# Patient Record
Sex: MALE | Race: OTHER | Employment: UNEMPLOYED | URBAN - METROPOLITAN AREA
[De-identification: names, ages, dates, MRNs, and addresses within clinical notes are randomized per-mention and may not be internally consistent; named-entity substitution may affect disease eponyms.]

---

## 2024-05-29 ENCOUNTER — HOSPITAL ENCOUNTER (INPATIENT)
Facility: HOSPITAL | Age: 44
LOS: 1 days | Discharge: LEFT BEFORE DISCHARGE | DRG: 770 | End: 2024-05-31
Attending: EMERGENCY MEDICINE | Admitting: STUDENT IN AN ORGANIZED HEALTH CARE EDUCATION/TRAINING PROGRAM
Payer: COMMERCIAL

## 2024-05-29 DIAGNOSIS — F10.929 ALCOHOL INTOXICATION (HCC): Primary | ICD-10-CM

## 2024-05-29 DIAGNOSIS — R09.02 HYPOXIA: ICD-10-CM

## 2024-05-29 DIAGNOSIS — R45.851 SUICIDAL IDEATIONS: ICD-10-CM

## 2024-05-29 LAB — ETHANOL SERPL-MCNC: 243 MG/DL

## 2024-05-29 PROCEDURE — 36415 COLL VENOUS BLD VENIPUNCTURE: CPT

## 2024-05-29 PROCEDURE — 82077 ASSAY SPEC XCP UR&BREATH IA: CPT

## 2024-05-29 PROCEDURE — 99285 EMERGENCY DEPT VISIT HI MDM: CPT

## 2024-05-29 NOTE — Clinical Note
Case was discussed with Kelsey Vivas PA-C and the patient's admission status was agreed to be Admission Status: observation status to the service of Dr. Geronimo

## 2024-05-30 ENCOUNTER — APPOINTMENT (EMERGENCY)
Dept: CT IMAGING | Facility: HOSPITAL | Age: 44
DRG: 770 | End: 2024-05-30
Payer: COMMERCIAL

## 2024-05-30 ENCOUNTER — APPOINTMENT (EMERGENCY)
Dept: RADIOLOGY | Facility: HOSPITAL | Age: 44
DRG: 770 | End: 2024-05-30
Payer: COMMERCIAL

## 2024-05-30 PROBLEM — F10.929 ALCOHOL INTOXICATION (HCC): Status: ACTIVE | Noted: 2024-05-30

## 2024-05-30 PROBLEM — E87.6 HYPOKALEMIA: Status: ACTIVE | Noted: 2024-05-30

## 2024-05-30 PROBLEM — R82.5 POSITIVE URINE DRUG SCREEN: Status: ACTIVE | Noted: 2024-05-30

## 2024-05-30 PROBLEM — R09.02 HYPOXIA: Status: ACTIVE | Noted: 2024-05-30

## 2024-05-30 LAB
ALBUMIN SERPL BCP-MCNC: 3.8 G/DL (ref 3.5–5)
ALP SERPL-CCNC: 62 U/L (ref 34–104)
ALT SERPL W P-5'-P-CCNC: 11 U/L (ref 7–52)
AMPHETAMINES SERPL QL SCN: NEGATIVE
ANION GAP SERPL CALCULATED.3IONS-SCNC: 9 MMOL/L (ref 4–13)
AST SERPL W P-5'-P-CCNC: 18 U/L (ref 13–39)
ATRIAL RATE: 87 BPM
BARBITURATES UR QL: NEGATIVE
BASOPHILS # BLD AUTO: 0.06 THOUSANDS/ÂΜL (ref 0–0.1)
BASOPHILS NFR BLD AUTO: 1 % (ref 0–1)
BENZODIAZ UR QL: POSITIVE
BILIRUB SERPL-MCNC: 0.32 MG/DL (ref 0.2–1)
BUN SERPL-MCNC: 13 MG/DL (ref 5–25)
CALCIUM SERPL-MCNC: 8.3 MG/DL (ref 8.4–10.2)
CARDIAC TROPONIN I PNL SERPL HS: <2 NG/L
CHLORIDE SERPL-SCNC: 108 MMOL/L (ref 96–108)
CO2 SERPL-SCNC: 26 MMOL/L (ref 21–32)
COCAINE UR QL: NEGATIVE
CREAT SERPL-MCNC: 0.7 MG/DL (ref 0.6–1.3)
EOSINOPHIL # BLD AUTO: 0.2 THOUSAND/ÂΜL (ref 0–0.61)
EOSINOPHIL NFR BLD AUTO: 3 % (ref 0–6)
ERYTHROCYTE [DISTWIDTH] IN BLOOD BY AUTOMATED COUNT: 13.7 % (ref 11.6–15.1)
ETHANOL SERPL-MCNC: 148 MG/DL
FENTANYL UR QL SCN: NEGATIVE
GFR SERPL CREATININE-BSD FRML MDRD: 116 ML/MIN/1.73SQ M
GLUCOSE SERPL-MCNC: 88 MG/DL (ref 65–140)
HCT VFR BLD AUTO: 40.4 % (ref 36.5–49.3)
HGB BLD-MCNC: 13.5 G/DL (ref 12–17)
HYDROCODONE UR QL SCN: NEGATIVE
IMM GRANULOCYTES # BLD AUTO: 0.06 THOUSAND/UL (ref 0–0.2)
IMM GRANULOCYTES NFR BLD AUTO: 1 % (ref 0–2)
LYMPHOCYTES # BLD AUTO: 2.27 THOUSANDS/ÂΜL (ref 0.6–4.47)
LYMPHOCYTES NFR BLD AUTO: 31 % (ref 14–44)
MCH RBC QN AUTO: 30.6 PG (ref 26.8–34.3)
MCHC RBC AUTO-ENTMCNC: 33.4 G/DL (ref 31.4–37.4)
MCV RBC AUTO: 92 FL (ref 82–98)
METHADONE UR QL: NEGATIVE
MONOCYTES # BLD AUTO: 0.34 THOUSAND/ÂΜL (ref 0.17–1.22)
MONOCYTES NFR BLD AUTO: 5 % (ref 4–12)
NEUTROPHILS # BLD AUTO: 4.52 THOUSANDS/ÂΜL (ref 1.85–7.62)
NEUTS SEG NFR BLD AUTO: 59 % (ref 43–75)
NRBC BLD AUTO-RTO: 0 /100 WBCS
OPIATES UR QL SCN: NEGATIVE
OXYCODONE+OXYMORPHONE UR QL SCN: NEGATIVE
P AXIS: 50 DEGREES
PCP UR QL: NEGATIVE
PLATELET # BLD AUTO: 177 THOUSANDS/UL (ref 149–390)
PMV BLD AUTO: 8.1 FL (ref 8.9–12.7)
POTASSIUM SERPL-SCNC: 3.1 MMOL/L (ref 3.5–5.3)
PR INTERVAL: 162 MS
PROT SERPL-MCNC: 6.6 G/DL (ref 6.4–8.4)
QRS AXIS: 87 DEGREES
QRSD INTERVAL: 100 MS
QT INTERVAL: 390 MS
QTC INTERVAL: 469 MS
RBC # BLD AUTO: 4.41 MILLION/UL (ref 3.88–5.62)
SODIUM SERPL-SCNC: 143 MMOL/L (ref 135–147)
T WAVE AXIS: 9 DEGREES
THC UR QL: POSITIVE
VENTRICULAR RATE: 87 BPM
WBC # BLD AUTO: 7.45 THOUSAND/UL (ref 4.31–10.16)

## 2024-05-30 PROCEDURE — 84484 ASSAY OF TROPONIN QUANT: CPT

## 2024-05-30 PROCEDURE — 85025 COMPLETE CBC W/AUTO DIFF WBC: CPT

## 2024-05-30 PROCEDURE — 71275 CT ANGIOGRAPHY CHEST: CPT

## 2024-05-30 PROCEDURE — 71045 X-RAY EXAM CHEST 1 VIEW: CPT

## 2024-05-30 PROCEDURE — 36415 COLL VENOUS BLD VENIPUNCTURE: CPT

## 2024-05-30 PROCEDURE — 80307 DRUG TEST PRSMV CHEM ANLYZR: CPT

## 2024-05-30 PROCEDURE — 82077 ASSAY SPEC XCP UR&BREATH IA: CPT

## 2024-05-30 PROCEDURE — 93010 ELECTROCARDIOGRAM REPORT: CPT | Performed by: INTERNAL MEDICINE

## 2024-05-30 PROCEDURE — 93005 ELECTROCARDIOGRAM TRACING: CPT

## 2024-05-30 PROCEDURE — 99223 1ST HOSP IP/OBS HIGH 75: CPT | Performed by: STUDENT IN AN ORGANIZED HEALTH CARE EDUCATION/TRAINING PROGRAM

## 2024-05-30 PROCEDURE — 80053 COMPREHEN METABOLIC PANEL: CPT

## 2024-05-30 RX ORDER — DIAZEPAM 5 MG/ML
10 INJECTION, SOLUTION INTRAMUSCULAR; INTRAVENOUS ONCE
Status: DISCONTINUED | OUTPATIENT
Start: 2024-05-30 | End: 2024-05-30

## 2024-05-30 RX ORDER — LORAZEPAM 2 MG/ML
4 INJECTION INTRAMUSCULAR ONCE
Status: CANCELLED | OUTPATIENT
Start: 2024-05-30 | End: 2024-05-30

## 2024-05-30 RX ORDER — DOCUSATE SODIUM 100 MG/1
100 CAPSULE, LIQUID FILLED ORAL 2 TIMES DAILY
Status: DISCONTINUED | OUTPATIENT
Start: 2024-05-30 | End: 2024-05-31

## 2024-05-30 RX ORDER — BUPROPION HYDROCHLORIDE 75 MG/1
75 TABLET ORAL 2 TIMES DAILY
COMMUNITY

## 2024-05-30 RX ORDER — LORAZEPAM 2 MG/ML
2 INJECTION INTRAMUSCULAR ONCE
Status: COMPLETED | OUTPATIENT
Start: 2024-05-30 | End: 2024-05-30

## 2024-05-30 RX ORDER — BUPRENORPHINE HYDROCHLORIDE AND NALOXONE HYDROCHLORIDE DIHYDRATE 8; 2 MG/1; MG/1
1 TABLET SUBLINGUAL DAILY
COMMUNITY

## 2024-05-30 RX ORDER — LORAZEPAM 1 MG/1
2 TABLET ORAL ONCE
Status: COMPLETED | OUTPATIENT
Start: 2024-05-30 | End: 2024-05-30

## 2024-05-30 RX ORDER — NICOTINE 21 MG/24HR
1 PATCH, TRANSDERMAL 24 HOURS TRANSDERMAL DAILY
Status: DISCONTINUED | OUTPATIENT
Start: 2024-05-30 | End: 2024-05-31

## 2024-05-30 RX ORDER — SENNOSIDES 8.6 MG
1 TABLET ORAL DAILY
Status: DISCONTINUED | OUTPATIENT
Start: 2024-05-30 | End: 2024-05-31

## 2024-05-30 RX ORDER — FOLIC ACID 1 MG/1
1 TABLET ORAL DAILY
Status: DISCONTINUED | OUTPATIENT
Start: 2024-05-30 | End: 2024-05-31

## 2024-05-30 RX ORDER — ACETAMINOPHEN 325 MG/1
650 TABLET ORAL EVERY 6 HOURS PRN
Status: DISCONTINUED | OUTPATIENT
Start: 2024-05-30 | End: 2024-05-31 | Stop reason: HOSPADM

## 2024-05-30 RX ORDER — POTASSIUM CHLORIDE 20 MEQ/1
40 TABLET, EXTENDED RELEASE ORAL ONCE
Status: COMPLETED | OUTPATIENT
Start: 2024-05-30 | End: 2024-05-30

## 2024-05-30 RX ORDER — CHLORDIAZEPOXIDE HYDROCHLORIDE 25 MG/1
25 CAPSULE, GELATIN COATED ORAL EVERY 12 HOURS
Status: DISCONTINUED | OUTPATIENT
Start: 2024-05-30 | End: 2024-05-31

## 2024-05-30 RX ORDER — DIAZEPAM 5 MG/ML
10 INJECTION, SOLUTION INTRAMUSCULAR; INTRAVENOUS ONCE
Status: COMPLETED | OUTPATIENT
Start: 2024-05-30 | End: 2024-05-30

## 2024-05-30 RX ORDER — LANOLIN ALCOHOL/MO/W.PET/CERES
100 CREAM (GRAM) TOPICAL DAILY
Status: DISCONTINUED | OUTPATIENT
Start: 2024-05-30 | End: 2024-05-31

## 2024-05-30 RX ORDER — HEPARIN SODIUM 5000 [USP'U]/ML
5000 INJECTION, SOLUTION INTRAVENOUS; SUBCUTANEOUS EVERY 8 HOURS SCHEDULED
Status: DISCONTINUED | OUTPATIENT
Start: 2024-05-30 | End: 2024-05-31

## 2024-05-30 RX ORDER — SODIUM CHLORIDE 9 MG/ML
100 INJECTION, SOLUTION INTRAVENOUS CONTINUOUS
Status: DISCONTINUED | OUTPATIENT
Start: 2024-05-30 | End: 2024-05-31

## 2024-05-30 RX ORDER — LORAZEPAM 2 MG/ML
4 INJECTION INTRAMUSCULAR ONCE
Status: COMPLETED | OUTPATIENT
Start: 2024-05-30 | End: 2024-05-30

## 2024-05-30 RX ORDER — CALCIUM CARBONATE 500 MG/1
1000 TABLET, CHEWABLE ORAL DAILY PRN
Status: DISCONTINUED | OUTPATIENT
Start: 2024-05-30 | End: 2024-05-31 | Stop reason: HOSPADM

## 2024-05-30 RX ORDER — METHYLPREDNISOLONE SODIUM SUCCINATE 40 MG/ML
40 INJECTION, POWDER, LYOPHILIZED, FOR SOLUTION INTRAMUSCULAR; INTRAVENOUS DAILY
Status: DISCONTINUED | OUTPATIENT
Start: 2024-05-30 | End: 2024-05-31

## 2024-05-30 RX ORDER — POTASSIUM CHLORIDE 20 MEQ/1
40 TABLET, EXTENDED RELEASE ORAL ONCE
Status: DISCONTINUED | OUTPATIENT
Start: 2024-05-30 | End: 2024-05-30

## 2024-05-30 RX ORDER — ONDANSETRON 2 MG/ML
4 INJECTION INTRAMUSCULAR; INTRAVENOUS EVERY 6 HOURS PRN
Status: DISCONTINUED | OUTPATIENT
Start: 2024-05-30 | End: 2024-05-31 | Stop reason: HOSPADM

## 2024-05-30 RX ADMIN — POTASSIUM CHLORIDE 40 MEQ: 1500 TABLET, EXTENDED RELEASE ORAL at 04:30

## 2024-05-30 RX ADMIN — ACETAMINOPHEN 650 MG: 325 TABLET, FILM COATED ORAL at 21:46

## 2024-05-30 RX ADMIN — DIAZEPAM 10 MG: 5 INJECTION, SOLUTION INTRAMUSCULAR; INTRAVENOUS at 13:48

## 2024-05-30 RX ADMIN — LORAZEPAM 4 MG: 2 INJECTION INTRAMUSCULAR; INTRAVENOUS at 21:47

## 2024-05-30 RX ADMIN — METHYLPREDNISOLONE SODIUM SUCCINATE 40 MG: 40 INJECTION, POWDER, FOR SOLUTION INTRAMUSCULAR; INTRAVENOUS at 08:52

## 2024-05-30 RX ADMIN — IOHEXOL 100 ML: 350 INJECTION, SOLUTION INTRAVENOUS at 04:50

## 2024-05-30 RX ADMIN — LORAZEPAM 2 MG: 1 TABLET ORAL at 08:51

## 2024-05-30 RX ADMIN — ACETAMINOPHEN 650 MG: 325 TABLET, FILM COATED ORAL at 08:47

## 2024-05-30 RX ADMIN — DIAZEPAM 10 MG: 10 INJECTION, SOLUTION INTRAMUSCULAR; INTRAVENOUS at 18:59

## 2024-05-30 RX ADMIN — HEPARIN SODIUM 5000 UNITS: 5000 INJECTION INTRAVENOUS; SUBCUTANEOUS at 20:16

## 2024-05-30 RX ADMIN — THIAMINE HCL TAB 100 MG 100 MG: 100 TAB at 11:01

## 2024-05-30 RX ADMIN — CHLORDIAZEPOXIDE HYDROCHLORIDE 25 MG: 25 CAPSULE ORAL at 13:46

## 2024-05-30 RX ADMIN — ONDANSETRON 4 MG: 2 INJECTION INTRAMUSCULAR; INTRAVENOUS at 21:47

## 2024-05-30 RX ADMIN — DOCUSATE SODIUM 100 MG: 100 CAPSULE, LIQUID FILLED ORAL at 18:02

## 2024-05-30 RX ADMIN — SENNOSIDES 8.6 MG: 8.6 TABLET, FILM COATED ORAL at 08:46

## 2024-05-30 RX ADMIN — SODIUM CHLORIDE 100 ML/HR: 0.9 INJECTION, SOLUTION INTRAVENOUS at 11:02

## 2024-05-30 RX ADMIN — DIAZEPAM 10 MG: 10 INJECTION, SOLUTION INTRAMUSCULAR; INTRAVENOUS at 17:58

## 2024-05-30 RX ADMIN — DOCUSATE SODIUM 100 MG: 100 CAPSULE, LIQUID FILLED ORAL at 08:46

## 2024-05-30 RX ADMIN — DIAZEPAM 10 MG: 10 INJECTION, SOLUTION INTRAMUSCULAR; INTRAVENOUS at 20:11

## 2024-05-30 RX ADMIN — FOLIC ACID 1 MG: 1 TABLET ORAL at 11:01

## 2024-05-30 RX ADMIN — LORAZEPAM 2 MG: 2 INJECTION INTRAMUSCULAR; INTRAVENOUS at 23:41

## 2024-05-30 RX ADMIN — HEPARIN SODIUM 5000 UNITS: 5000 INJECTION INTRAVENOUS; SUBCUTANEOUS at 13:22

## 2024-05-30 NOTE — ED PROVIDER NOTES
"History  Chief Complaint   Patient presents with    Alcohol Intoxication     Pt picked up in [place] for [facility] recovery. Transporters allowed pt to buy etoh en route. Pt drank 1/5th of MINNA en route. Pt denied admittance d/t being \"too intoxicated.\"     Patient is a 43-year-old male who presents to the emergency department for alcohol intoxication.  Patient was on his way to [facility] when he drank a couple pints of various liquor. Denies any drug use.  Denies any other symptoms. Denies SI/HI.           None       Past Medical History:   Diagnosis Date    Bipolar 2 disorder (HCC)        Past Surgical History:   Procedure Laterality Date    HAND SURGERY         History reviewed. No pertinent family history.  I have reviewed and agree with the history as documented.    E-Cigarette/Vaping    E-Cigarette Use Current Some Day User      E-Cigarette/Vaping Substances     Social History     Tobacco Use    Smoking status: Every Day     Types: Cigarettes    Smokeless tobacco: Never   Vaping Use    Vaping status: Some Days   Substance Use Topics    Alcohol use: Yes     Comment: daily - couple L vodka daily per pt    Drug use: Yes     Types: Marijuana, Other     Comment: suboxone       Review of Systems   Constitutional:  Negative for chills and fever.   HENT:  Negative for ear pain and sore throat.    Eyes:  Negative for pain and visual disturbance.   Respiratory:  Negative for cough and shortness of breath.    Cardiovascular:  Negative for chest pain and palpitations.   Gastrointestinal:  Negative for abdominal pain, nausea and vomiting.   Genitourinary:  Negative for dysuria, flank pain and hematuria.   Musculoskeletal:  Negative for arthralgias and back pain.   Skin:  Negative for color change and rash.   Neurological:  Negative for seizures, syncope and headaches.   Psychiatric/Behavioral:  Negative for confusion, self-injury and suicidal ideas.    All other systems reviewed and are negative.      Physical " Exam  Physical Exam  Vitals and nursing note reviewed.   Constitutional:       General: He is not in acute distress.     Appearance: He is well-developed.   HENT:      Head: Normocephalic and atraumatic.      Mouth/Throat:      Mouth: Mucous membranes are moist.   Eyes:      Conjunctiva/sclera: Conjunctivae normal.   Cardiovascular:      Rate and Rhythm: Normal rate and regular rhythm.      Pulses: Normal pulses.      Heart sounds: No murmur heard.  Pulmonary:      Effort: Pulmonary effort is normal. No respiratory distress.      Breath sounds: Rhonchi (Bilateral throughout) present.   Abdominal:      Palpations: Abdomen is soft.      Tenderness: There is no abdominal tenderness.   Musculoskeletal:         General: No swelling.      Cervical back: Neck supple.   Skin:     General: Skin is warm and dry.      Capillary Refill: Capillary refill takes less than 2 seconds.   Neurological:      Mental Status: He is alert.   Psychiatric:         Mood and Affect: Mood normal.      Comments: Patient intoxicated          Vital Signs  ED Triage Vitals   Temperature Pulse Respirations Blood Pressure SpO2   05/29/24 2230 05/29/24 2046 05/29/24 2046 05/29/24 2046 05/29/24 2046   97.8 °F (36.6 °C) 84 22 117/75 (!) 82 %      Temp Source Heart Rate Source Patient Position - Orthostatic VS BP Location FiO2 (%)   05/29/24 2230 05/30/24 0127 05/30/24 0127 05/29/24 2046 --   Oral Monitor Sitting Right arm       Pain Score       05/30/24 0305       No Pain           Vitals:    05/30/24 0445 05/30/24 0556 05/30/24 0600 05/30/24 0730   BP: 113/69 119/81 119/81 111/72   Pulse: 84 62 65 68   Patient Position - Orthostatic VS:  Sitting  Sitting         Visual Acuity      ED Medications  Medications   acetaminophen (TYLENOL) tablet 650 mg (has no administration in time range)   calcium carbonate (TUMS) chewable tablet 1,000 mg (has no administration in time range)   docusate sodium (COLACE) capsule 100 mg (has no administration in time range)    senna (SENOKOT) tablet 8.6 mg (has no administration in time range)   ondansetron (ZOFRAN) injection 4 mg (has no administration in time range)   nicotine (NICODERM CQ) 14 mg/24hr TD 24 hr patch 1 patch (has no administration in time range)   heparin (porcine) subcutaneous injection 5,000 Units (has no administration in time range)   potassium chloride (Klor-Con M20) CR tablet 40 mEq (has no administration in time range)   methylPREDNISolone sodium succinate (Solu-MEDROL) injection 40 mg (has no administration in time range)   potassium chloride (Klor-Con M20) CR tablet 40 mEq (40 mEq Oral Given 5/30/24 0430)   iohexol (OMNIPAQUE) 350 MG/ML injection (MULTI-DOSE) 100 mL (100 mL Intravenous Given 5/30/24 0450)       Diagnostic Studies  Results Reviewed       Procedure Component Value Units Date/Time    HS Troponin 0hr (reflex protocol) [[id number]]  (Normal) Collected: 05/30/24 0303    Lab Status: Final result Specimen: Blood from Arm, Right Updated: 05/30/24 0332     hs TnI 0hr <2 ng/L     Comprehensive metabolic panel [[id number]]  (Abnormal) Collected: 05/30/24 0303    Lab Status: Final result Specimen: Blood from Arm, Right Updated: 05/30/24 0324     Sodium 143 mmol/L      Potassium 3.1 mmol/L      Chloride 108 mmol/L      CO2 26 mmol/L      ANION GAP 9 mmol/L      BUN 13 mg/dL      Creatinine 0.70 mg/dL      Glucose 88 mg/dL      Calcium 8.3 mg/dL      AST 18 U/L      ALT 11 U/L      Alkaline Phosphatase 62 U/L      Total Protein 6.6 g/dL      Albumin 3.8 g/dL      Total Bilirubin 0.32 mg/dL      eGFR 116 ml/min/1.73sq m     Narrative:      National Kid[employer] Disease Foundation guidelines for Chronic Kidney Disease (CKD):     Stage 1 with normal or high GFR (GFR > 90 mL/min/1.73 square meters)    Stage 2 Mild CKD (GFR = 60-89 mL/min/1.73 square meters)    Stage 3A Moderate CKD (GFR = 45-59 mL/min/1.73 square meters)    Stage 3B Moderate CKD (GFR = 30-44 mL/min/1.73 square meters)    Stage 4 Severe CKD (GFR = 15-29  mL/min/1.73 square meters)    Stage 5 End Stage CKD (GFR <15 mL/min/1.73 square meters)  Note: GFR calculation is accurate only with a steady state creatinine    CBC and differential [[id number]]  (Abnormal) Collected: 05/30/24 0303    Lab Status: Final result Specimen: Blood from Arm, Right Updated: 05/30/24 0310     WBC 7.45 Thousand/uL      RBC 4.41 Million/uL      Hemoglobin 13.5 g/dL      Hematocrit 40.4 %      MCV 92 fL      MCH 30.6 pg      MCHC 33.4 g/dL      RDW 13.7 %      MPV 8.1 fL      Platelets 177 Thousands/uL      nRBC 0 /100 WBCs      Segmented % 59 %      Immature Grans % 1 %      Lymphocytes % 31 %      Monocytes % 5 %      Eosinophils Relative 3 %      Basophils Relative 1 %      Absolute Neutrophils 4.52 Thousands/µL      Absolute Immature Grans 0.06 Thousand/uL      Absolute Lymphocytes 2.27 Thousands/µL      Absolute Monocytes 0.34 Thousand/µL      Eosinophils Absolute 0.20 Thousand/µL      Basophils Absolute 0.06 Thousands/µL     Rapid drug screen, urine [[id number]]  (Abnormal) Collected: 05/30/24 0024    Lab Status: Final result Specimen: Urine, Clean Catch Updated: 05/30/24 0159     Amph/Meth UR Negative     Barbiturate Ur Negative     Benzodiazepine Urine Positive     Cocaine Urine Negative     Methadone Urine Negative     Opiate Urine Negative     PCP Ur Negative     THC Urine Positive     Oxycodone Urine Negative     Fentanyl Urine Negative     HYDROCODONE URINE Negative    Narrative:      Presumptive report. If requested, specimen will be sent to reference lab for confirmation.  FOR MEDICAL PURPOSES ONLY.   IF CONFIRMATION NEEDED PLEASE CONTACT THE LAB WITHIN 5 DAYS.    Drug Screen Cutoff Levels:  AMPHETAMINE/METHAMPHETAMINES  1000 ng/mL  BARBITURATES     200 ng/mL  BENZODIAZEPINES     200 ng/mL  COCAINE      300 ng/mL  METHADONE      300 ng/mL  OPIATES      300 ng/mL  PHENCYCLIDINE     25 ng/mL  THC       50 ng/mL  OXYCODONE      100 ng/mL  FENTANYL      5 ng/mL  HYDROCODONE     300  ng/mL    Ethanol [[id number]]  (Abnormal) Collected: 05/30/24 0024    Lab Status: Final result Specimen: Blood from Arm, Left Updated: 05/30/24 0050     Ethanol Lvl 148 mg/dL     Ethanol [[id number]]  (Abnormal) Collected: 05/29/24 2106    Lab Status: Final result Specimen: Blood from Arm, Left Updated: 05/29/24 2128     Ethanol Lvl 243 mg/dL                    CTA ED chest PE Study   Final Result by Aguila Conti DO (05/30 0521)      Some images are suboptimal secondary to respiratory motion which decreases sensitivity for evaluation of peripheral pulmonary emboli, subject to this, no pulmonary embolism is seen.      Parenchymal and paraseptal emphysematous changes with linear atelectasis dependently and in the bilateral lower lung fields but the lungs otherwise appear grossly clear.      Age-indeterminate mild compression fracture of T3, correlation with the patient's symptoms recommended.      Coronary atherosclerosis, old fracture of the left 7th rib, and other findings as above.            Workstation performed: [id number]         XR chest 1 view portable    (Results Pending)              Procedures  ECG 12 Lead Documentation Only    Date/Time: 5/30/2024 3:15 AM    Performed by: Sharron Smith PA-C  Authorized by: Sharron Smith PA-C    Indications / Diagnosis:  Hypoxia  ECG reviewed by me, the ED Provider: yes    Patient location:  ED  Interpretation:     Interpretation: normal    Rate:     ECG rate:  87    ECG rate assessment: normal    ST segments:     ST segments:  Normal  T waves:     T waves: normal             ED Course  ED Course as of 05/30/24 0805   Wed May 29, 2024   2129 ETHANOL(!): 243   Thu May 30, 2024   0056 ETHANOL(!): 148   0159 BENZODIAZEPINE URINE(!): Positive   0200 THC URINE(!): Positive   0202 Patient hypoxic to 85-88% on RA. Patient denies any symptoms.    0327 Potassium(!): 3.1   0528 CTA ED chest PE Study     IMPRESSION:     Some images are suboptimal secondary to  respiratory motion which decreases sensitivity for evaluation of peripheral pulmonary emboli, subject to this, no pulmonary embolism is seen.     Parenchymal and paraseptal emphysematous changes with linear atelectasis dependently and in the bilateral lower lung fields but the lungs otherwise appear grossly clear.     Age-indeterminate mild compression fracture of T3, correlation with the patient's symptoms recommended.     Coronary atherosclerosis, old fracture of the left 7th rib, and other findings as above.                  HEART Risk Score      Flowsheet Row Most Recent Value   Heart Score Risk Calculator    History 0 Filed at: 05/30/2024 0343   ECG 0 Filed at: 05/30/2024 0343   Age 0 Filed at: 05/30/2024 0343   Risk Factors 1 Filed at: 05/30/2024 0343   Troponin 0 Filed at: 05/30/2024 0343   HEART Score 1 Filed at: 05/30/2024 0343                          SBIRT 20yo+      Flowsheet Row Most Recent Value   Initial Alcohol Screen: US AUDIT-C     1. How often do you have a drink containing alcohol? 6 Filed at: 05/29/2024 2046   2. How many drinks containing alcohol do you have on a typical day you are drinking?  0 Filed at: 05/29/2024 2046   3a. Male UNDER 65: How often do you have five or more drinks on one occasion? 0 Filed at: 05/29/2024 2046   3b. FEMALE Any Age, or MALE 65+: How often do you have 4 or more drinks on one occassion? 0 Filed at: 05/29/2024 2046   Audit-C Score 6 Filed at: 05/29/2024 2046   LEON: How many times in the past year have you...    Used an illegal drug or used a prescription medication for non-medical reasons? Daily or Almost Daily Filed at: 05/29/2024 2046                      Medical Decision Making  43-year-old male presenting for alcohol intoxication. However, prior to discharge patient noted to be hypoxic ~85 on room air. Denies any symptoms. Urine + for benzodiazepines and THC. Repeat ethanol level at midnight 148. Patient has continued to be hypoxic and needing 2 L of nasal  "cannula to maintain good oxygenation saturations above 90%. Continues to deny any cardiac or respiratory symptoms. Bilateral rhonchi throughout on physical exam, no other acute findings. CT PE study showed, \"IMPRESSION:    Some images are suboptimal secondary to respiratory motion which decreases sensitivity for evaluation of peripheral pulmonary emboli, subject to this, no pulmonary embolism is seen.    Parenchymal and paraseptal emphysematous changes with linear atelectasis dependently and in the bilateral lower lung fields but the lungs otherwise appear grossly clear.    Age-indeterminate mild compression fracture of T3, correlation with the patient's symptoms recommended\".  Potassium 3.1, repleted in the ED. No other acute findings on lab work. EKG shows NSR without ischemic changes. 0-hour troponin <2. During ambulatory pulse ox, patient also hypoxic and still needing nasal cannula. Other vital signs stable. Discussed with internal medicine for admission. Patient to be admitted to their service. D/w patient. Patient understands and consents to admission.     Amount and/or Complexity of Data Reviewed  Labs: ordered. Decision-making details documented in ED Course.  Radiology: ordered. Decision-making details documented in ED Course.    Risk  Prescription drug management.  Decision regarding hospitalization.             Disposition  Final diagnoses:   Alcohol intoxication (HCC)   Hypoxia     Time reflects when diagnosis was documented in both MDM as applicable and the Disposition within this note       Time User Action Codes Description Comment    5/30/2024 12:56 AM Sharron Smith Add [F10.929] Alcohol intoxication (HCC)     5/30/2024  5:36 AM Sharron Smith Add [R09.02] Hypoxia           ED Disposition       ED Disposition   Admit    Condition   Stable    Date/Time   Thu May 30, 2024 0537    Comment   Case was discussed with Kelsey Vivas PA-C and the patient's admission status was agreed to be Admission " Status: observation status to the service of Dr. Aggarwal.               Follow-up Information    None         Patient's Medications    No medications on file       No discharge procedures on file.    PDMP Review       None            ED Provider  Electronically Signed by             Sharron Smith PA-C  05/30/24 0885

## 2024-05-30 NOTE — PLAN OF CARE
Problem: SAFETY,RESTRAINT: NV/NON-SELF DESTRUCTIVE BEHAVIOR  Goal: Remains free of harm/injury (restraint for non violent/non self-detsructive behavior)  Description: INTERVENTIONS:  - Instruct patient/family regarding restraint use   - Assess and monitor physiologic and psychological status   - Provide interventions and comfort measures to meet assessed patient needs   - Identify and implement measures to help patient regain control  - Assess readiness for release of restraint   Outcome: Progressing

## 2024-05-30 NOTE — H&P
[facility]  H&P  Name: Delonte Goldberg 43 y.o. male I MRN: [id number]  Unit/Bed#: ED 10 I Date of Admission: 5/29/2024   Date of Service: 5/30/2024 I Hospital Day: 0      Assessment & Plan   * Alcohol intoxication (HCC)  Assessment & Plan  Presenting with elevated alcohol level  Start CIWA protocol to monitor for withdrawal  Start IV fluids and monitor lytes     Hypokalemia  Assessment & Plan  Likely from alcohol use  Check magnesium level   Replete and monitor     Hypoxia  Assessment & Plan  Transient hypoxia  CT imaging showing emphysematous changes to lungs  Benzo use and alcohol intoxication likely exacerbating symptoms  Start IV steroids for now  Monitor     Positive urine drug screen  Assessment & Plan  + for benzos, THC  Also with elevated alcohol level  Monitor for withdrawal on CIWA         VTE Pharmacologic Prophylaxis:   Moderate Risk (Score 3-4) - Pharmacological DVT Prophylaxis Ordered: heparin.  Code Status: Level 1 - Full Code   Discussion with family: Patient declined call to .     Anticipated Length of Stay: Patient will be admitted on an observation basis with an anticipated length of stay of less than 2 midnights secondary to alcohol intoxication .    Total Time Spent on Date of Encounter in care of patient: 30+ mins. This time was spent on one or more of the following: performing physical exam; counseling and coordination of care; obtaining or reviewing history; documenting in the medical record; reviewing/ordering tests, medications or procedures; communicating with other healthcare professionals and discussing with patient's family/caregivers.    Chief Complaint: alcohol intoxication     History of Present Illness:  Delonte Goldberg is a 43 y.o. male who presents with alcohol intoxication. He was on his way to [facility] and drank a couple pints of liqour leading to acute intoxication. He was brought to the ED for further  "evaluation. Found to be hypoxic in ED and now admitted to [place] for further management.    Review of Systems:  Review of Systems   Constitutional:  Negative for chills and fever.   HENT:  Negative for ear pain and sore throat.    Eyes:  Negative for pain and visual disturbance.   Respiratory:  Negative for cough and shortness of breath.    Cardiovascular:  Negative for chest pain and palpitations.   Gastrointestinal:  Negative for abdominal pain and vomiting.   Genitourinary:  Negative for dysuria and hematuria.   Musculoskeletal:  Negative for arthralgias and back pain.   Skin:  Negative for color change and rash.   Neurological:  Negative for seizures and syncope.   All other systems reviewed and are negative.      Past Medical and Surgical History:   Past Medical History:   Diagnosis Date    Bipolar 2 disorder (HCC)        Past Surgical History:   Procedure Laterality Date    HAND SURGERY         Meds/Allergies:  Prior to Admission medications    Not on File     I have reviewed home medications using recent Epic encounter.    Allergies: No Known Allergies    Social History:  Marital Status: Single   Occupation: na  Patient Pre-hospital Living Situation: Home  Patient Pre-hospital Level of Mobility: walks  Patient Pre-hospital Diet Restrictions: na  Substance Use History:   Social History     Substance and Sexual Activity   Alcohol Use Yes    Comment: daily - couple L vodka daily per pt     Social History     Tobacco Use   Smoking Status Every Day    Types: Cigarettes   Smokeless Tobacco Never     Social History     Substance and Sexual Activity   Drug Use Yes    Types: Marijuana, Other    Comment: suboxone       Family History:  History reviewed. No pertinent family history.    Physical Exam:     Vitals:   Blood Pressure: 118/75 (05/30/24 0845)  Pulse: 66 (05/30/24 0845)  Temperature: 98.5 °F (36.9 °C) (05/30/24 0556)  Temp Source: Oral (05/30/24 0556)  Respirations: 19 (05/30/24 0845)  Height: 5' 7\" (170.2 cm) " (05/29/24 2046)  Weight - Scale: 81.8 kg (180 lb 5.4 oz) (05/30/24 0556)  SpO2: 96 % (05/30/24 0845)    Physical Exam  Constitutional:       General: He is not in acute distress.     Appearance: Normal appearance. He is not toxic-appearing.   Cardiovascular:      Rate and Rhythm: Normal rate and regular rhythm.      Heart sounds: Normal heart sounds. No murmur heard.  Pulmonary:      Effort: Pulmonary effort is normal. No respiratory distress.      Breath sounds: Normal breath sounds. No wheezing.   Abdominal:      General: Abdomen is flat. There is no distension.      Palpations: Abdomen is soft.      Tenderness: There is no abdominal tenderness.   Neurological:      General: No focal deficit present.      Mental Status: He is alert and oriented to person, place, and time. Mental status is at baseline.      Motor: No weakness.          Additional Data:     Lab Results:  Results from last 7 days   Lab Units 05/30/24  0303   WBC Thousand/uL 7.45   HEMOGLOBIN g/dL 13.5   HEMATOCRIT % 40.4   PLATELETS Thousands/uL 177   SEGS PCT % 59   LYMPHO PCT % 31   MONO PCT % 5   EOS PCT % 3     Results from last 7 days   Lab Units 05/30/24  0303   SODIUM mmol/L 143   POTASSIUM mmol/L 3.1*   CHLORIDE mmol/L 108   CO2 mmol/L 26   BUN mg/dL 13   CREATININE mg/dL 0.70   ANION GAP mmol/L 9   CALCIUM mg/dL 8.3*   ALBUMIN g/dL 3.8   TOTAL BILIRUBIN mg/dL 0.32   ALK PHOS U/L 62   ALT U/L 11   AST U/L 18   GLUCOSE RANDOM mg/dL 88                       Lines/Drains:  Invasive Devices       Peripheral Intravenous Line  Duration             Peripheral IV 05/29/24 Left Antecubital 1 day                        Imaging: Reviewed radiology reports from this admission including: chest CT scan  CTA ED chest PE Study   Final Result by Aguila Conti DO (05/30 0521)      Some images are suboptimal secondary to respiratory motion which decreases sensitivity for evaluation of peripheral pulmonary emboli, subject to this, no pulmonary  embolism is seen.      Parenchymal and paraseptal emphysematous changes with linear atelectasis dependently and in the bilateral lower lung fields but the lungs otherwise appear grossly clear.      Age-indeterminate mild compression fracture of T3, correlation with the patient's symptoms recommended.      Coronary atherosclerosis, old fracture of the left 7th rib, and other findings as above.            Workstation performed: [id number]         XR chest 1 view portable    (Results Pending)       EKG and Other Studies Reviewed on Admission:   EKG:  sinus.    ** Please Note: This note has been constructed using a voice recognition system. **

## 2024-05-30 NOTE — ASSESSMENT & PLAN NOTE
Presenting with elevated alcohol level  Start CIWA protocol to monitor for withdrawal  Start IV fluids and monitor lytes

## 2024-05-30 NOTE — ED NOTES
Patient pulled out his L FA IV, this Rn attempted to change patient / leaned linens, which he refused     New IV placed L wrist     Mini Wakefield  05/30/24 6551

## 2024-05-30 NOTE — ED NOTES
Inserted another 20 G IV into L hand, Sbar sent to floor, patient being transferred to floor now,     Mini Wakefield  05/30/24 8060

## 2024-05-30 NOTE — ASSESSMENT & PLAN NOTE
Transient hypoxia  CT imaging showing emphysematous changes to lungs  Benzo use and alcohol intoxication likely exacerbating symptoms  Start IV steroids for now  Monitor

## 2024-05-30 NOTE — ED NOTES
Patient ripped out L wirst IV, is refusing IV and states he's leaving, patient escorted back to room and SLIM provider made aware of this      Mini Wakefield  05/30/24 2806

## 2024-05-30 NOTE — ED NOTES
Ambulatory pulse ox done at this time. Pt O2 level at 88% while ambulating, pt denies any SOB, chest pain, and dizziness while walking. Provider made aware.      Rubi Bertrand  05/30/24 0617

## 2024-05-31 ENCOUNTER — TELEPHONE (OUTPATIENT)
Dept: PSYCHIATRY | Facility: CLINIC | Age: 44
End: 2024-05-31

## 2024-05-31 VITALS
WEIGHT: 180.34 LBS | BODY MASS INDEX: 28.3 KG/M2 | DIASTOLIC BLOOD PRESSURE: 83 MMHG | HEART RATE: 88 BPM | OXYGEN SATURATION: 96 % | SYSTOLIC BLOOD PRESSURE: 138 MMHG | HEIGHT: 67 IN | TEMPERATURE: 98 F | RESPIRATION RATE: 12 BRPM

## 2024-05-31 PROBLEM — F12.90 CANNABIS USE DISORDER: Chronic | Status: ACTIVE | Noted: 2024-05-31

## 2024-05-31 PROBLEM — R09.02 HYPOXIA: Status: RESOLVED | Noted: 2024-05-30 | Resolved: 2024-05-31

## 2024-05-31 PROBLEM — E87.6 HYPOKALEMIA: Status: RESOLVED | Noted: 2024-05-30 | Resolved: 2024-05-31

## 2024-05-31 PROBLEM — F10.20 ALCOHOL USE DISORDER, SEVERE, DEPENDENCE (HCC): Chronic | Status: ACTIVE | Noted: 2024-05-31

## 2024-05-31 PROBLEM — F11.20 OPIOID DEPENDENCE (HCC): Status: ACTIVE | Noted: 2024-05-31

## 2024-05-31 PROBLEM — R45.851 PASSIVE SUICIDAL IDEATIONS: Status: ACTIVE | Noted: 2024-05-31

## 2024-05-31 LAB
ANION GAP SERPL CALCULATED.3IONS-SCNC: 9 MMOL/L (ref 4–13)
BUN SERPL-MCNC: 8 MG/DL (ref 5–25)
CALCIUM SERPL-MCNC: 9 MG/DL (ref 8.4–10.2)
CHLORIDE SERPL-SCNC: 108 MMOL/L (ref 96–108)
CO2 SERPL-SCNC: 25 MMOL/L (ref 21–32)
CREAT SERPL-MCNC: 0.58 MG/DL (ref 0.6–1.3)
ERYTHROCYTE [DISTWIDTH] IN BLOOD BY AUTOMATED COUNT: 13.8 % (ref 11.6–15.1)
GFR SERPL CREATININE-BSD FRML MDRD: 125 ML/MIN/1.73SQ M
GLUCOSE P FAST SERPL-MCNC: 109 MG/DL (ref 65–99)
GLUCOSE SERPL-MCNC: 109 MG/DL (ref 65–140)
HCT VFR BLD AUTO: 38.7 % (ref 36.5–49.3)
HGB BLD-MCNC: 13 G/DL (ref 12–17)
MAGNESIUM SERPL-MCNC: 1.5 MG/DL (ref 1.9–2.7)
MCH RBC QN AUTO: 30.6 PG (ref 26.8–34.3)
MCHC RBC AUTO-ENTMCNC: 33.6 G/DL (ref 31.4–37.4)
MCV RBC AUTO: 91 FL (ref 82–98)
PLATELET # BLD AUTO: 180 THOUSANDS/UL (ref 149–390)
PMV BLD AUTO: 8.6 FL (ref 8.9–12.7)
POTASSIUM SERPL-SCNC: 3.6 MMOL/L (ref 3.5–5.3)
RBC # BLD AUTO: 4.25 MILLION/UL (ref 3.88–5.62)
SODIUM SERPL-SCNC: 142 MMOL/L (ref 135–147)
WBC # BLD AUTO: 7.14 THOUSAND/UL (ref 4.31–10.16)

## 2024-05-31 PROCEDURE — 99239 HOSP IP/OBS DSCHRG MGMT >30: CPT | Performed by: NURSE PRACTITIONER

## 2024-05-31 PROCEDURE — 85027 COMPLETE CBC AUTOMATED: CPT | Performed by: STUDENT IN AN ORGANIZED HEALTH CARE EDUCATION/TRAINING PROGRAM

## 2024-05-31 PROCEDURE — 80048 BASIC METABOLIC PNL TOTAL CA: CPT | Performed by: STUDENT IN AN ORGANIZED HEALTH CARE EDUCATION/TRAINING PROGRAM

## 2024-05-31 PROCEDURE — 83735 ASSAY OF MAGNESIUM: CPT | Performed by: STUDENT IN AN ORGANIZED HEALTH CARE EDUCATION/TRAINING PROGRAM

## 2024-05-31 PROCEDURE — NC001 PR NO CHARGE: Performed by: NURSE PRACTITIONER

## 2024-05-31 PROCEDURE — 99449 NTRPROF PH1/NTRNET/EHR 31/>: CPT | Performed by: EMERGENCY MEDICINE

## 2024-05-31 RX ORDER — CHLORDIAZEPOXIDE HYDROCHLORIDE 25 MG/1
50 CAPSULE, GELATIN COATED ORAL EVERY 6 HOURS SCHEDULED
Status: DISCONTINUED | OUTPATIENT
Start: 2024-05-31 | End: 2024-05-31 | Stop reason: HOSPADM

## 2024-05-31 RX ORDER — MAGNESIUM SULFATE HEPTAHYDRATE 40 MG/ML
4 INJECTION, SOLUTION INTRAVENOUS ONCE
Status: COMPLETED | OUTPATIENT
Start: 2024-05-31 | End: 2024-05-31

## 2024-05-31 RX ORDER — ENOXAPARIN SODIUM 100 MG/ML
40 INJECTION SUBCUTANEOUS
Status: DISCONTINUED | OUTPATIENT
Start: 2024-05-31 | End: 2024-05-31 | Stop reason: HOSPADM

## 2024-05-31 RX ORDER — LORAZEPAM 2 MG/ML
4 INJECTION INTRAMUSCULAR ONCE
Status: COMPLETED | OUTPATIENT
Start: 2024-05-31 | End: 2024-05-31

## 2024-05-31 RX ORDER — DEXTROSE, SODIUM CHLORIDE, SODIUM LACTATE, POTASSIUM CHLORIDE, AND CALCIUM CHLORIDE 5; .6; .31; .03; .02 G/100ML; G/100ML; G/100ML; G/100ML; G/100ML
100 INJECTION, SOLUTION INTRAVENOUS CONTINUOUS
Status: DISCONTINUED | OUTPATIENT
Start: 2024-05-31 | End: 2024-05-31 | Stop reason: HOSPADM

## 2024-05-31 RX ORDER — LORAZEPAM 2 MG/ML
4 INJECTION INTRAMUSCULAR ONCE
Status: DISCONTINUED | OUTPATIENT
Start: 2024-05-31 | End: 2024-05-31

## 2024-05-31 RX ORDER — AMOXICILLIN 250 MG
2 CAPSULE ORAL 2 TIMES DAILY
Status: DISCONTINUED | OUTPATIENT
Start: 2024-05-31 | End: 2024-05-31 | Stop reason: HOSPADM

## 2024-05-31 RX ORDER — NICOTINE 21 MG/24HR
1 PATCH, TRANSDERMAL 24 HOURS TRANSDERMAL DAILY
Status: DISCONTINUED | OUTPATIENT
Start: 2024-05-31 | End: 2024-05-31 | Stop reason: HOSPADM

## 2024-05-31 RX ORDER — POTASSIUM CHLORIDE 20 MEQ/1
40 TABLET, EXTENDED RELEASE ORAL ONCE
Status: COMPLETED | OUTPATIENT
Start: 2024-05-31 | End: 2024-05-31

## 2024-05-31 RX ADMIN — POTASSIUM CHLORIDE 40 MEQ: 1500 TABLET, EXTENDED RELEASE ORAL at 09:01

## 2024-05-31 RX ADMIN — THIAMINE HYDROCHLORIDE: 100 INJECTION, SOLUTION INTRAMUSCULAR; INTRAVENOUS at 12:00

## 2024-05-31 RX ADMIN — CHLORDIAZEPOXIDE HYDROCHLORIDE 25 MG: 25 CAPSULE ORAL at 00:41

## 2024-05-31 RX ADMIN — CHLORDIAZEPOXIDE HYDROCHLORIDE 50 MG: 25 CAPSULE ORAL at 13:03

## 2024-05-31 RX ADMIN — NICOTINE 1 PATCH: 14 PATCH, EXTENDED RELEASE TRANSDERMAL at 04:35

## 2024-05-31 RX ADMIN — HEPARIN SODIUM 5000 UNITS: 5000 INJECTION INTRAVENOUS; SUBCUTANEOUS at 04:36

## 2024-05-31 RX ADMIN — MAGNESIUM SULFATE HEPTAHYDRATE 4 G: 40 INJECTION, SOLUTION INTRAVENOUS at 08:46

## 2024-05-31 RX ADMIN — ENOXAPARIN SODIUM 40 MG: 40 INJECTION SUBCUTANEOUS at 09:01

## 2024-05-31 RX ADMIN — SODIUM CHLORIDE 100 ML/HR: 0.9 INJECTION, SOLUTION INTRAVENOUS at 00:42

## 2024-05-31 RX ADMIN — LORAZEPAM 4 MG: 2 INJECTION INTRAMUSCULAR; INTRAVENOUS at 02:47

## 2024-05-31 RX ADMIN — LORAZEPAM 4 MG: 2 INJECTION INTRAMUSCULAR; INTRAVENOUS at 06:06

## 2024-05-31 RX ADMIN — DEXTROSE, SODIUM CHLORIDE, SODIUM LACTATE, POTASSIUM CHLORIDE, AND CALCIUM CHLORIDE 100 ML/HR: 5; .6; .31; .03; .02 INJECTION, SOLUTION INTRAVENOUS at 08:46

## 2024-05-31 NOTE — UTILIZATION REVIEW
"Initial Clinical Review      OBS order 5/30 0623 converted to IP on 5/31 1024 for inc level of care , elevated CIWA scores and ICU monitoring .    Admission: Date/Time/Statement:   Admission Orders (From admission, onward)       Ordered        05/31/24 1024  INPATIENT ADMISSION  Once            05/30/24 0623  Place in Observation  Once                           INPATIENT ADMISSION     Standing Status:   Standing     Number of Occurrences:   1     Order Specific Question:   Level of Care     Answer:   Med Surg [16]     Order Specific Question:   Estimated length of stay     Answer:   More than 2 Midnights     Order Specific Question:   Certification     Answer:   I certify that inpatient services are medically necessary for this patient for a duration of greater than two midnights. See H&P and MD Progress Notes for additional information about the patient's course of treatment.     ED Arrival Information       Expected   -    Arrival   5/29/2024 20:39    Acuity   Urgent              Means of arrival   Ambulance    Escorted by   JN (Denys)    Service   Hospitalist    Admission type   Emergency              Arrival complaint   etoh             Chief Complaint   Patient presents with    Alcohol Intoxication     Pt picked up in [place] for [facility] recovery. [employer]porters allowed pt to buy etoh en route. Pt drank 1/5th of MINNA en route. Pt denied admittance d/t being \"too intoxicated.\"       Initial Presentation: 43 y.o. male to ED from recovery [place] via EMS w/ alcohol intoxication. He was on his way to [facility] and drank a couple pints of liqour leading to acute intoxication. Found to be hypoxic in ED , O2 sat  82 % . CT imaging showing emphysematous changes to lungs . Admitted OBS status w/ alcohol in toxication , hypokalemia , hypoxia and + UDS . Plan to start CIWA , IVF and monitor lytes . Check mg replace K and mg prn and monitor . UDS + benzos and THC , monitor for withdraw .     Anticipated " Length of Stay/Certification Statement: Patient will be admitted on an observation basis with an anticipated length of stay of less than 2 midnights secondary to alcohol intoxication .     5/31 Quick Note   progressively worsening CIWA scores. Patient has received multiple doses of Valium and Ativan overnight. Of note, control team called x 2 last night due to patient ripping out of his soft restraints and attempting to get physical with the staff- he is now in locked restraints with 1:1. Responding to Ativan for a few hours but at 6AM CIWA back to 19. Last alcoholic beverage 05/29. Patient may need ICU level of care later today for Precedex gtt or phenobarb.     5/31 Toxicology Consult   CIWA , tele , cont pulse ox . Stepdown for CIWA >7 . Once withdraw managed , placed on valium taper prn .     5/31 IM Note   Etoh level 243 cont CIWA . Reports to me that he wants to leave today, and wants to go to rehab today. I asked the patient about comments he made regarding wanting to hang himself. He states that he probably said that to his mom, and that she does make him want to kill himself. When I asked him if he actively wants to kill himself, he denied.   CIWA score 7-18 + tactile disturbances, tremor, sweats , anxiety and agitation .    5/31 Behavioral Health Consult   Continue using CIWA scale for monitoring of alcohol withdrawal initiate treatment plan for withdrawal sxs recommended by Medical Toxicology .  Recommend sitter for pt safety + elopement risk  Disposition: recommend pt continue w/plan for substance abuse rehab program    5/31 1456  Pt left AMA     ED Triage Vitals   Temperature Pulse Respirations Blood Pressure SpO2   05/29/24 2230 05/29/24 2046 05/29/24 2046 05/29/24 2046 05/29/24 2046   97.8 °F (36.6 °C) 84 22 117/75 (!) 82 %      Temp Source Heart Rate Source Patient Position - Orthostatic VS BP Location FiO2 (%)   05/29/24 2230 05/30/24 0127 05/30/24 0127 05/29/24 2046 --   Oral Monitor Sitting Right  arm       Pain Score       05/30/24 0305       No Pain          Wt Readings from Last 1 Encounters:   05/30/24 81.8 kg (180 lb 5.4 oz)     Additional Vital Signs:   05/31/24 1014 98 °F (36.7 °C) -- 12 -- -- -- -- -- -- --   05/31/24 0830 97.9 °F (36.6 °C) -- -- -- -- -- -- -- -- --   05/31/24 0736 -- 88 -- 125/78 -- -- -- -- -- --   05/31/24 07:02:49 -- -- -- 124/70 88 -- -- -- -- --   05/31/24 0700 97.8 °F (36.6 °C) 84 18 124/70 88 -- -- -- -- Lying   05/31/24 06:54:15 -- -- -- 124/70 88 -- -- -- -- --   05/31/24 05:47:19 -- 89 -- 149/90 110 -- -- -- -- --   05/31/24 0547 97.9 °F (36.6 °C) -- 17 149/90 110 -- -- -- -- Lying   05/31/24 03:50:24 97.5 °F (36.4 °C) 74 16 136/82 100 96 % -- -- None (Room air) Lying   05/31/24 02:32:55 97.6 °F (36.4 °C) 73 15 128/74 92 -- -- -- -- Lying   05/31/24 01:04:24 97.8 °F (36.6 °C) 72 15 125/80 95 -- -- -- -- --   05/30/24 23:22:21 -- -- -- 117/78 91 -- -- -- -- --   05/30/24 2322 -- 71 -- 117/78 -- -- -- -- -- --   05/30/24 22:47:51 97.3 °F (36.3 °C) Abnormal  72 14 117/77 90 -- -- -- -- Lying   05/30/24 21:31:53 97.7 °F (36.5 °C) 72 18 120/76 91 -- -- -- -- Lying   05/30/24 19:53:50 98 °F (36.7 °C) 67 17 119/76 90 96 % -- -- None (Room air) Lying   05/30/24 16:24:26 97.3 °F (36.3 °C) Abnormal  63 -- 119/76 90 -- -- -- -- --   05/30/24 1505 -- 64 20 118/75 -- 95 % -- -- None (Room air) --   05/30/24 1320 -- 86 -- 122/74 -- -- -- -- -- --   05/30/24 0845 -- 66 19 118/75 91 96 % -- -- None (Room air) Lying   05/30/24 0842 -- 63 -- -- -- -- -- -- -- --   05/30/24 0730 -- 68 19 111/72 88 95 % -- -- None (Room air) Sitting   05/30/24 0616 -- -- -- -- -- 88 % Abnormal  -- -- -- --   05/30/24 0600 -- 65 18 119/81 96 92 % 28 2 L/min Nasal cannula --   05/30/24 0556 98.5 °F (36.9 °C) 62 20 119/81 -- 92 % -- -- None (Room air) Sitting   05/30/24 0445 -- 84 12 113/69 86 95 % -- -- -- --   05/30/24 0305 -- 90 16 121/64 -- 96 % 28 2 L/min Nasal cannula --   05/30/24 0127 98.4 °F (36.9 °C)  -- 12 104/59 77 95 % -- -- Nasal cannula Sitting   05/29/24 2230 97.8 °F (36.6 °C) 84 10 Abnormal  108/73 86 95 % -- -- -- --   05/29/24 2215 -- 83 13 104/70 81 96 % 28 2 L/min Nasal cannula --   05/29/24 2048 -- -- -- -- -- 92 % 28 2 L/min Na      Pertinent Labs/Diagnostic Test Results:   5/30 EKG NSR   CTA ED chest PE Study   Final Result by Aguila Conti DO (05/30 0521)      Some images are suboptimal secondary to respiratory motion which decreases sensitivity for evaluation of peripheral pulmonary emboli, subject to this, no pulmonary embolism is seen.      Parenchymal and paraseptal emphysematous changes with linear atelectasis dependently and in the bilateral lower lung fields but the lungs otherwise appear grossly clear.      Age-indeterminate mild compression fracture of T3, correlation with the patient's symptoms recommended.      Coronary atherosclerosis, old fracture of the left 7th rib, and other findings as above.            Workstation performed: [id number]         XR chest 1 view portable   Final Result by Vitor Mcfadden MD (05/30 1612)      Bibasilar subsegmental atelectasis.            Workstation performed: [id number]               Results from last 7 days   Lab Units 05/31/24  0447 05/30/24  0303   WBC Thousand/uL 7.14 7.45   HEMOGLOBIN g/dL 13.0 13.5   HEMATOCRIT % 38.7 40.4   PLATELETS Thousands/uL 180 177   TOTAL NEUT ABS Thousands/µL  --  4.52         Results from last 7 days   Lab Units 05/31/24  0447 05/30/24  0303   SODIUM mmol/L 142 143   POTASSIUM mmol/L 3.6 3.1*   CHLORIDE mmol/L 108 108   CO2 mmol/L 25 26   ANION GAP mmol/L 9 9   BUN mg/dL 8 13   CREATININE mg/dL 0.58* 0.70   EGFR ml/min/1.73sq m 125 116   CALCIUM mg/dL 9.0 8.3*   MAGNESIUM mg/dL 1.5*  --      Results from last 7 days   Lab Units 05/30/24  0303   AST U/L 18   ALT U/L 11   ALK PHOS U/L 62   TOTAL PROTEIN g/dL 6.6   ALBUMIN g/dL 3.8   TOTAL BILIRUBIN mg/dL 0.32         Results from last 7 days   Lab Units  05/31/24  0447 05/30/24  0303   GLUCOSE RANDOM mg/dL 109 88         Results from last 7 days   Lab Units 05/30/24  0303   HS TNI 0HR ng/L <2       Results from last 7 days   Lab Units 05/30/24  0024   AMPH/METH  Negative   BARBITURATE UR  Negative   BENZODIAZEPINE UR  Positive*   COCAINE UR  Negative   METHADONE URINE  Negative   OPIATE UR  Negative   PCP UR  Negative   THC UR  Positive*     Results from last 7 days   Lab Units 05/30/24  0024 05/29/24  2106   ETHANOL LVL mg/dL 148* 243*       ED Treatment:   Medication Administration from 05/29/2024 2039 to 05/30/2024 1616         Date/Time Order Dose Route Action     05/30/2024 0430 EDT potassium chloride (Klor-Con M20) CR tablet 40 mEq 40 mEq Oral Given     05/30/2024 0847 EDT acetaminophen (TYLENOL) tablet 650 mg 650 mg Oral Given     05/30/2024 0846 EDT docusate sodium (COLACE) capsule 100 mg 100 mg Oral Given     05/30/2024 0846 EDT senna (SENOKOT) tablet 8.6 mg 8.6 mg Oral Given     05/30/2024 1322 EDT heparin (porcine) subcutaneous injection 5,000 Units 5,000 Units Subcutaneous Given     05/30/2024 0852 EDT methylPREDNISolone sodium succinate (Solu-MEDROL) injection 40 mg 40 mg Intravenous Given     05/30/2024 0851 EDT LORazepam (ATIVAN) tablet 2 mg 2 mg Oral Given     05/30/2024 1102 EDT sodium chloride 0.9 % infusion 100 mL/hr Intravenous New Bag     05/30/2024 1101 EDT folic acid (FOLVITE) tablet 1 mg 1 mg Oral Given     05/30/2024 1101 EDT thiamine tablet 100 mg 100 mg Oral Given     05/30/2024 1348 EDT diazepam (VALIUM) injection 10 mg 10 mg Intravenous Given     05/30/2024 1346 EDT chlordiazePOXIDE (LIBRIUM) capsule 25 mg 25 mg Oral Given          Past Medical History:   Diagnosis Date    Bipolar 2 disorder (HCC)      Present on Admission:  **None**      Admitting Diagnosis: Alcohol intoxication (HCC) [F10.929]  Hypoxia [R09.02]  Age/Sex: 43 y.o. male  Admission Orders:  Scheduled Medications:  chlordiazePOXIDE, 50 mg, Oral, Q6H GEORGINA  enoxaparin, 40 mg,  Subcutaneous, Q24H GEORGINA  folic acid 1 mg, thiamine (VITAMIN B1) 100 mg in sodium chloride 0.9 % 100 mL IV piggyback, , Intravenous, Daily  magnesium sulfate, 4 g, Intravenous, Once  nicotine, 1 patch, Transdermal, Daily  senna-docusate sodium, 2 tablet, Oral, BID      Continuous IV Infusions:  dextrose 5% lactated ringer's, 100 mL/hr, Intravenous, Continuous      PRN Meds:  acetaminophen, 650 mg, Oral, Q6H PRN  calcium carbonate, 1,000 mg, Oral, Daily PRN  ondansetron, 4 mg, Intravenous, Q6H PRN    Seizure precautions   CIWA q1hr   Tele   Reg diet   Cont OBS   I&O   SCD  Up and OOB       IP CONSULT TO TOXICOLOGY    Network Utilization Review Department  ATTENTION: Please call with any questions or concerns to [phone] and carefully listen to the prompts so that you are directed to the right person. All voicemails are confidential.   For Discharge needs, contact Care Management DC Support Team at [phone] opt. 2  Send all requests for admission clinical reviews, approved or denied determinations and any other requests to dedicated fax number below belonging to the [place] where the patient is receiving treatment. List of dedicated fax numbers for the Facilities:  FACILITY NAME UR FAX NUMBER   ADMISSION DENIALS (Administrative/Medical Necessity) [phone]   DISCHARGE SUPPORT TEAM (NETWORK) [phone]   PARENT CHILD HEALTH (Maternity/NICU/Pediatrics) [phone]   [facility] [phone]   [facility] [phone]   [facility] [phone]   [facility] [phone]   [facility] [phone]   [facility] [phone]   [facility] [phone]   [facility] [phone]   [facility] [phone]   [facility] [phone]   [facility]  [facility] [phone]   [facility] [phone]

## 2024-05-31 NOTE — ASSESSMENT & PLAN NOTE
Patient presented to the ED with acute alcoholic intoxication while he was on his way to [facility] - while en route to the facility, he ingested several pints of alcohol.   ETOH level on admission 243  Continue [facility] protocol - monitor closely.  Seizure precautions.  Toxicology consult, appreciate input

## 2024-05-31 NOTE — CONSULTS
TeleConsultation - Behavioral Health   Delonte Goldberg 43 y.o. male MRN: [id number]  Unit/Bed#: ICU 03 Encounter: [id number]      VIRTUAL CARE DOCUMENTATION:     1. This service was provided via Telemedicine using Teams Virtual Rounding      2. Parties in the room with patient during teleconsult Patient only    3. Confidentiality Other method to assure confidentiality were taken no other persons at interviewer location      4. Participants No one else was in the room    5. Patient acknowledged consent and understanding of privacy and security of the  Telemedicine consult. I informed the patient that I have reviewed their record in Epic and presented the opportunity for them to ask any questions regarding the visit today.  The patient agreed to participate.    6. Total time spent:        Assessment & Plan     Present on Admission:   Alcohol intoxication (HCC)   Positive urine drug screen   Alcohol use disorder, severe, dependence (HCC)   Cannabis use disorder      Assessment:    Bipolar Disorder, unspecified (by hx)  Alcohol dependence  Cannabis dependence  Opioid dependence    Treatment Plan:    Planned Medication Changes:    No changes recommended.    Other Recommendations  Continue using CIWA scale for monitoring of alcohol withdrawal initiate treatment plan for withdrawal sxs recommended by Medical Toxicology    2. Recommend verifying patient's current psychotropic medications w/pt's recovery program (was en route to [facility]'s Recovery).    Pt reports medications include: suboxone, Wellbutrin XL 150mg, gabapentin 600mg TID, zyprexa 10mg.    Pt reports gabapentin has been helpful for his anxiety--may help reduce some of pt's current restlessness, as he reports an anxiety level of 11/10.    3. Recommend sitter for pt safety + elopement risk  Disposition: recommend pt continue w/plan for substance abuse rehab program  Current Medications:     Current Facility-Administered Medications   Medication Dose Route  "Frequency Provider Last Rate    acetaminophen  650 mg Oral Q6H PRN TYRELL Peña      calcium carbonate  1,000 mg Oral Daily PRN TYRELL Peña      chlordiazePOXIDE  50 mg Oral Q6H GEORGINA TYRELL Peña      dextrose 5% lactated ringer's  100 mL/hr Intravenous Continuous TYRELL Peña 100 mL/hr (05/31/24 0846)    enoxaparin  40 mg Subcutaneous Q24H [facility] TYRELL Peña      folic acid 1 mg, thiamine (VITAMIN B1) 100 mg in sodium chloride 0.9 % 100 mL IV piggyback   Intravenous Daily TYRELL Peña      nicotine  1 patch Transdermal Daily TYRELL Peña      ondansetron  4 mg Intravenous Q6H PRN TYRELL Peña      senna-docusate sodium  2 tablet Oral BID TYRELL Peña         Risks / Benefits of Treatment:    N/A    Other treatment modalities recommended as indicated:    See above \"other recommendations\"      Inpatient consult to Psychiatry  Consult performed by: Delfina Arnett DO  Consult ordered by: TYRELL Gomez        Physician Requesting Consult: Demarco Marshall MD  Principal Problem:Alcohol intoxication (HCC)    Reason for Consult: suicidal threats      History of Present Illness      Patient is a 43 y.o. male with a history of  Bipolar d/o  who  was admitted to the medical service on 5/29/2024 due to acute alcohol intoxication. Psychiatric consultation was requested due to  pt making suicidal threats .     Psychiatric symptoms prior to consultation included cannabis abuse and alcohol abuse. He presented to the ED after reportedly drinking while on the way to rehab. Per chart review, \"Pt picked up in [place] for [facility] recovery. [employer] allowed pt to buy etoh en route. Pt drank 1/5th of MINNA en route. Pt denied admittance d/t being \"too intoxicated.\"    He says he does not recall making suicidal threats. Comments reported were pt stated he wanted to hang himself. Pt states that he has never wanted to hurt himself stating \"It " "must have been a drunken stupor, because it would never be nothing I'd want to.\"  He admits to having suicidal ideation in the past w/o plan or intent. He deneis any prior suicide attempt. Reports last SI was 3yrs ago.   Pt denies any recent periods of depression, increased psychosocial stress, or abnormally elevated mood. He reports current anxiety that he attributes to being in the hospital vs. Rehab.      Substance use hx:  EtOH: last use yesterday, first use \"in 20s\" reported current use- daily 5-8 beers. Longest period of sobriety-1.5yr- occurred 3yrs ago.  Reports 1 WD seizure occurring approx 2yrs ago    Cannabis: last use- a couple of days ago, first use age 16- typical use \"a couple of joints a day\"     Nicotine- cigarettes- 1PPD, also vapes    Hx of pain management, per pt. Currently reports he is on suboxone    Denies illicit bzd use, denies other substance use  Psychiatric Review Of Systems:    sleep: no  appetite changes: no  weight changes: no  energy/anergy: no  interest/pleasure/anhedonia: no  somatic symptoms: no  anxiety/panic: no  roman: no  guilty/hopeless: no  self injurious behavior/risky behavior: no    Historical Information     Past Psychiatric History:     Psychiatric Hospitalizations:   Past substance abuse tx    Family Psychiatric History:      Substance Abuse Mother  Illness: alcoholism and Sister Illness: alcoholism    Social History:      Marital history:   Children: 3 - age 20, 17?, 14  Living arrangement, social support: The patient lives in home with mother.  Occupational History: unclear        Past Medical History:   Diagnosis Date    Bipolar 2 disorder (HCC)        Medical Review Of Systems:    Review of Systems    Meds/Allergies     all current active meds have been reviewed  No Known Allergies    Objective     Vital signs in last 24 hours:  Temp:  [97.3 °F (36.3 °C)-98 °F (36.7 °C)] 98 °F (36.7 °C)  HR:  [63-89] 88  Resp:  [12-20] 12  BP: (117-149)/(70-90) " 138/83      Intake/Output Summary (Last 24 hours) at 5/31/2024 1433  Last data filed at 5/31/2024 1139  Gross per 24 hour   Intake 0 ml   Output 400 ml   Net -400 ml       Mental Status Evaluation:    Appearance:  older than stated age   Behavior:  restless and fidgety   Speech:  normal pitch and normal volume   Mood:  anxious   Affect:  constricted   Language: anomia No and aphasia  No   Thought Process:  goal directed   Associations intact associations   Thought Content:  normal   Perceptual Disturbances: None   Risk Potential: Suicidal Ideations none  Homicidal Ideations none  Potential for Aggression No   Sensorium:  person and situation   Cognition:  recent memory mildly impaired, remote memory grossly intact   Consciousness:  alert    Attention: attention span and concentration were age appropriate   Intellect: not examined   Fund of Knowledge:    Insight:  impaired due to substance use recently   Judgment: impaired due to hx of substance use                   Lab Results: I have personally reviewed all pertinent laboratory/tests results.         Imaging Studies: No results found.  EKG/Pathology/Other Studies:   Lab Results   Component Value Date    VENTRATE 87 05/30/2024    ATRIALRATE 87 05/30/2024    PRINT 162 05/30/2024    QRSDINT 100 05/30/2024    QTINT 390 05/30/2024    QTCINT 469 05/30/2024    PAXIS 50 05/30/2024    QRSAXIS 87 05/30/2024    TWAVEAXIS 9 05/30/2024        Code Status: Level 1 - Full Code  Advance Directive and Living Will:      Power of :    POLST:      Screenings:    1. Nutrition Screening  Nutrition Assessment (completed by Staff): Nutrition  Feeding: Able to feed self  Diet Type: Regular/House    2. Pain Screening  Pain Screening: Pain Assessment  Pain Assessment Tool: 0-10  Pain Score: 0  Pain Location/Orientation: Location: Head  Pain Onset/Description: Onset: Ongoing  Patient's Stated Pain Goal: No pain    3. Suicide Screening  C-SSRS Screening (Nursing Assessment - recent):     C-SSRS Screening (Nursing Assessment - since last contact):      Suicide Risk Assessment completed by the Consultant: The following ratings are based on assessment at the time of the interview. Demographic risk factors include: ,  status, male. Historical Risk Factors include: chronic psychiatric problems, alcohol use, drug use. Recent Specific Risk Factors include: substance abuse. Protective Factors: no current suicidal ideation, access to mental health treatment, being a parent, connection to own children, having a desire to live, having a sense of purpose or meaning in life, stable living environment. Weapons: none. The following steps have been taken to ensure weapons are properly secured: not applicable. Based on today's assessment, Delonte presents the following risk of harm to self: low.

## 2024-05-31 NOTE — DISCHARGE SUMMARY
[facility]  Discharge- Delonte Goldberg 1980, 43 y.o. male MRN: [id number]  Unit/Bed#: ICU 03 Encounter: [id number]  Primary Care Provider: No primary care provider on file.   Date and time admitted to hospital: 5/29/2024  8:39 PM    * Alcohol intoxication (HCC)  Assessment & Plan  Patient presented to the ED with acute alcoholic intoxication while he was on his way to [facility] - while en route to the facility, he ingested several pints of alcohol.   ETOH level on admission 243  CIWA scores improved, noted to be 2 at 1400.  Toxicology consult, appreciate input    Passive suicidal ideations  Assessment & Plan  Patient with history of bipolar disorder  Has made comments multiple times since admission that he wants to hang himself  When questioned if he actively wants to kill himself, he denies this.  Seen by psychiatry in consultation, no inpatient needs at this time.    Positive urine drug screen  Assessment & Plan  UDS noted to be positive for Benzos/THC        Medical Problems       Resolved Problems  Date Reviewed: 5/31/2024            Resolved    Hypoxia 5/31/2024     Resolved by  TYRELL Gomez    Hypokalemia 5/31/2024     Resolved by  TYRELL Gomez        Discharging Physician / Practitioner: TYRELL Gomez  PCP: No primary care provider on file.  Admission Date:   Admission Orders (From admission, onward)       Ordered        05/31/24 1024  INPATIENT ADMISSION  Once            05/30/24 0623  Place in Observation  Once                          Discharge Date: 05/31/24    Consultations During Hospital Stay:  IP CONSULT TO TOXICOLOGY  IP CONSULT TO PSYCHIATRY    Procedures Performed:   None    Significant Findings / Test Results:   XR chest 1 view portable    Result Date: 5/30/2024  Bibasilar subsegmental atelectasis. Workstation performed: [id number]     CTA ED chest PE Study    Result Date: 5/30/2024  Some images are  suboptimal secondary to respiratory motion which decreases sensitivity for evaluation of peripheral pulmonary emboli, subject to this, no pulmonary embolism is seen. Parenchymal and paraseptal emphysematous changes with linear atelectasis dependently and in the bilateral lower lung fields but the lungs otherwise appear grossly clear. Age-indeterminate mild compression fracture of T3, correlation with the patient's symptoms recommended. Coronary atherosclerosis, old fracture of the left 7th rib, and other findings as above. Workstation performed: [id number]      Incidental Findings:   None    Test Results Pending at Discharge (will require follow up):   None     Outpatient Tests Requested:  Follow up with pcp  Follow up with rehab center    Complications:  None    Reason for Admission: Alcohol intoxication    Hospital Course:   Delonte Goldberg is a 43 y.o. male patient with past medical history of bipolar disorder who originally presented to the hospital on 5/29/2024 due to acute alcohol intoxication while he was in route to [facility], patient was brought to the ER.  EtOH level on admission was 243 after patient drinks several pints of alcohol on his way.      Patient admitted as he had hypokalemia, hypoxia in the ER.  Patient was initiated on the CIWA protocol and was monitored closely.  There were issues overnight when patient became agitated and pulled out his own IV resulting in control team x 2.  He was attempted to have soft wrist restraints however it did not work and locked restraints were ordered.    Throughout the night, patient was given multiple doses of IV Valium and Ativan with improvement in his overall mood allowing him to be more cooperative.  He had made passive suicidal ideations about wanting to hang himself, when he was questioned about this he did deny ever wanting to actually hurt himself.    Psychiatry was consulted and did evaluate the patient and recommended patient  "continue to follow-up with substance abuse treatment and did not recommend any inpatient treatment.    CIWA scores continue to improve noted to be 2 at 1400 prior to discharge.  It was recommended that patient remain in the hospital for another 24 hours for further monitoring and Librium dosing for alcohol withdrawal.  Patient refused and wanted to leave the hospital AGAINST MEDICAL ADVICE.  He was advised that leaving against medical advice could result in seizures, cardiac dysrhythmias, and possibly death.  He still wanted to leave AMA.  This was discussed with him in the company of the primary RN, Luis and PCA 1:1.      Please see above list of diagnoses and related plan for additional information.     Condition at Discharge: good    Discharge Day Visit / Exam:   Subjective: Patient found to be sitting on the sofa at this time, cooperative and calm.  Denies any complaints of chest pain, shortness of breath, fever or chills.  Denies any complaints of abdominal pain or nausea or vomiting.  Denies any desire to kill himself or hurt or kill anyone else.    Vitals: Blood Pressure: 138/83 (05/31/24 1139)  Pulse: 88 (05/31/24 0736)  Temperature: 98 °F (36.7 °C) (05/31/24 1014)  Temp Source: Oral (05/31/24 0700)  Respirations: 12 (05/31/24 1014)  Height: 5' 7\" (170.2 cm) (05/29/24 2046)  Weight - Scale: 81.8 kg (180 lb 5.4 oz) (05/30/24 0556)  SpO2: 96 % (05/31/24 0350)    Exam:     Physical Exam  Vitals and nursing note reviewed.   Constitutional:       General: He is not in acute distress.  Cardiovascular:      Rate and Rhythm: Normal rate.      Pulses: Normal pulses.      Heart sounds: Normal heart sounds.   Pulmonary:      Effort: Pulmonary effort is normal.      Breath sounds: Normal breath sounds.   Abdominal:      General: Bowel sounds are normal.      Palpations: Abdomen is soft.   Musculoskeletal:         General: Normal range of motion.   Skin:     General: Skin is warm and dry.   Neurological:      Mental " Status: He is alert and oriented to person, place, and time.   Psychiatric:         Mood and Affect: Mood is anxious.         Behavior: Behavior normal. Behavior is cooperative.         Thought Content: Thought content does not include homicidal or suicidal ideation. Thought content does not include homicidal or suicidal plan.         Cognition and Memory: Cognition and memory normal.          Discussion with Family: Patient declined call to .     Discharge instructions/Information to patient and family:   See after visit summary for information provided to patient and family.      Provisions for Follow-Up Care:  See after visit summary for information related to follow-up care and any pertinent home health orders.      Mobility at time of Discharge:   Basic Mobility Inpatient Raw Score: 23  JH-HLM Goal: 7: Walk 25 feet or more  JH-HLM Achieved: 7: Walk 25 feet or more  HLM Goal achieved. Continue to encourage appropriate mobility.     Disposition:   Home Against medical advice    Planned Readmission: None     Discharge Statement:  I spent 45 minutes discharging the patient. This time was spent on the day of discharge. I had direct contact with the patient on the day of discharge. Greater than 50% of the total time was spent examining patient, answering all patient questions, arranging and discussing plan of care with patient as well as directly providing post-discharge instructions.  Additional time then spent on discharge activities.    Discharge Medications:  See after visit summary for reconciled discharge medications provided to patient and/or family.      **Please Note: This note may have been constructed using a voice recognition system**

## 2024-05-31 NOTE — RESTRAINT FACE TO FACE
Restraint Face to Face   Delonte Goldberg 43 y.o. male MRN: [id number]  Unit/Bed#: ICU 03 Encounter: [id number]      Physical Evaluation: Awake, alert x 4.  He is aware of where he is, why he is here and wants to go to [place] today.  Purpose for Restraints/ Seclusion  High risk for self harm, High risk for causing significant disruption of treatment environment , High risk for harm to others, and high risk for flight  Patient's reaction to the intervention: Currently noted to be cooperative but Anxious.  Patient's medical condition: Hemodynamically stable, labs are stable, but last CIWA at 0736 was 18.  Patient's Behavioral condition: anxious  Restraints to be Continued  Working on slowly discontinuing restraints.

## 2024-05-31 NOTE — ASSESSMENT & PLAN NOTE
Patient with history of bipolar disorder  Has made comments multiple times since admission that he wants to hang himself  When questioned if he actively wants to kill himself, he denies this.

## 2024-05-31 NOTE — NURSING NOTE
"Patient arrived to the floor and became agitated and removed his IV. Patient was educated on the purpose of his admission and the reason for the need to have a new IV inserted. The patient repeatedly refused and threatened to leave. Control team called for assistance. SLIM notified and visited the patient. Patient agreed to a new IV. A new IV was inserted and wrapped. Within 30 minutes he removed the IV. Control team called for assistance again. SLIM notified, soft restraints ordered, patient placed on 2 point restraints and posey. Patient was educated again on the need for the IV again. IV placed and wrapped. The patient removed wrist restraints and threatened to leave again. Patient stated, \"I want to hang and kill myself.\" An attempt was made to apply soft wrist restraints again but the patient refused and became agitated and physical. Control team called for assistance. On call notified, locked restraints ordered.  "

## 2024-05-31 NOTE — PROGRESS NOTES
[facility]  Progress Note  Name: Delonte Goldberg I  MRN: [id number]  Unit/Bed#: ICU 03 I Date of Admission: 5/29/2024   Date of Service: 5/31/2024 I Hospital Day: 0    Assessment & Plan   * Alcohol intoxication (HCC)  Assessment & Plan  Patient presented to the ED with acute alcoholic intoxication while he was on his way to [facility] - while en route to the facility, he ingested several pints of alcohol.   ETOH level on admission 243  Continue CIWA protocol - monitor closely.  Seizure precautions.  Toxicology consult, appreciate input    Passive suicidal ideations  Assessment & Plan  Patient with history of bipolar disorder  Has made comments multiple times since admission that he wants to hang himself  When questioned if he actively wants to kill himself, he denies this.    Positive urine drug screen  Assessment & Plan  UDS noted to be positive for Benzos/THC    Hypokalemia-resolved as of 5/31/2024  Assessment & Plan  Resolved.    Hypoxia-resolved as of 5/31/2024  Assessment & Plan  Resolved.           VTE Pharmacologic Prophylaxis:   Moderate Risk (Score 3-4) - Pharmacological DVT Prophylaxis Ordered: enoxaparin (Lovenox).    Mobility:   Basic Mobility Inpatient Raw Score: 23  JH-HLM Goal: 7: Walk 25 feet or more  JH-HLM Achieved: 7: Walk 25 feet or more  JH-HLM Goal achieved. Continue to encourage appropriate mobility.    Patient Centered Rounds: I performed bedside rounds with nursing staff today.   Discussions with Specialists or Other Care Team Provider: Case Management, Toxicology    Education and Discussions with Family / Patient: Patient declined call to .     Total Time Spent on Date of Encounter in care of patient: 40 mins. This time was spent on one or more of the following: performing physical exam; counseling and coordination of care; obtaining or reviewing history; documenting in the medical record; reviewing/ordering tests, medications or  procedures; communicating with other healthcare professionals and discussing with patient's family/caregivers.    Current Length of Stay: 0 day(s)  Current Patient Status: Inpatient   Certification Statement: The patient will continue to require additional inpatient hospital stay due to ongoing treatment in setting of alcohol withdrawal.  Discharge Plan: Anticipate discharge tomorrow to [facility] Rehab    Code Status: Level 1 - Full Code    Subjective:   Patient resting in bed, 3/4 restraints in place.     He denies any complaints of chest pain, shortness of breath, fever or chills.  Reports to me that he wants to leave today, and wants to go to rehab today.  I asked the patient about comments he made regarding wanting to hang himself.  He states that he probably said that to his mom, and that she does make him want to kill himself.  When I asked him if he actively wants to kill himself, he denied.    Objective:     Vitals:   Temp (24hrs), Av.7 °F (36.5 °C), Min:97.3 °F (36.3 °C), Max:98 °F (36.7 °C)    Temp:  [97.3 °F (36.3 °C)-98 °F (36.7 °C)] 98 °F (36.7 °C)  HR:  [63-89] 88  Resp:  [12-20] 12  BP: (117-149)/(70-90) 138/83  SpO2:  [95 %-96 %] 96 %  Body mass index is 28.24 kg/m².     Input and Output Summary (last 24 hours):     Intake/Output Summary (Last 24 hours) at 2024 1201  Last data filed at 2024 1139  Gross per 24 hour   Intake 0 ml   Output 600 ml   Net -600 ml       Physical Exam:   Physical Exam  Vitals and nursing note reviewed.   Constitutional:       General: He is not in acute distress.     Appearance: He is ill-appearing.   HENT:      Mouth/Throat:      Dentition: Abnormal dentition.   Cardiovascular:      Rate and Rhythm: Normal rate.      Pulses: Normal pulses.      Heart sounds: Normal heart sounds.   Pulmonary:      Effort: Pulmonary effort is normal.      Breath sounds: Normal breath sounds.   Abdominal:      General: Bowel sounds are normal.      Palpations: Abdomen is soft.    Musculoskeletal:         General: Normal range of motion.      Right lower leg: No edema.      Left lower leg: No edema.   Skin:     General: Skin is warm and dry.      Comments: Skin on all four extremities intact, no sign of skin breakdown.   Neurological:      Mental Status: He is alert and oriented to person, place, and time.   Psychiatric:         Mood and Affect: Affect is labile.         Behavior: Behavior is agitated.          Additional Data:     Labs:  Results from last 7 days   Lab Units 05/31/24 0447 05/30/24  0303   WBC Thousand/uL 7.14 7.45   HEMOGLOBIN g/dL 13.0 13.5   HEMATOCRIT % 38.7 40.4   PLATELETS Thousands/uL 180 177   SEGS PCT %  --  59   LYMPHO PCT %  --  31   MONO PCT %  --  5   EOS PCT %  --  3     Results from last 7 days   Lab Units 05/31/24 0447 05/30/24  0303   SODIUM mmol/L 142 143   POTASSIUM mmol/L 3.6 3.1*   CHLORIDE mmol/L 108 108   CO2 mmol/L 25 26   BUN mg/dL 8 13   CREATININE mg/dL 0.58* 0.70   ANION GAP mmol/L 9 9   CALCIUM mg/dL 9.0 8.3*   ALBUMIN g/dL  --  3.8   TOTAL BILIRUBIN mg/dL  --  0.32   ALK PHOS U/L  --  62   ALT U/L  --  11   AST U/L  --  18   GLUCOSE RANDOM mg/dL 109 88                       Lines/Drains:  Invasive Devices       Peripheral Intravenous Line  Duration             Peripheral IV 05/30/24 Distal;Dorsal (posterior);Right Forearm <1 day                      Telemetry:  Telemetry Orders (From admission, onward)               24 Hour Telemetry Monitoring  Continuous x 24 Hours (Telem)        Question:  Reason for 24 Hour Telemetry  Answer:  Alcohol withdrawal and CIWA >7, electrolyte abnormalities, abnormal ECG and/or heart disease                     Telemetry Reviewed: Normal Sinus Rhythm and Sinus Tachycardia  Indication for Continued Telemetry Use: DT's with history of heart disease or abnormal EKG             Imaging: No pertinent imaging reviewed.    Recent Cultures (last 7 days):         Last 24 Hours Medication List:   Current  Facility-Administered Medications   Medication Dose Route Frequency Provider Last Rate    acetaminophen  650 mg Oral Q6H PRN TYRELL Peña      calcium carbonate  1,000 mg Oral Daily PRN TYRELL Peña      chlordiazePOXIDE  50 mg Oral Q6H GEORGINA TYRELL Peña      dextrose 5% lactated ringer's  100 mL/hr Intravenous Continuous TYRELL Peña 100 mL/hr (05/31/24 0846)    enoxaparin  40 mg Subcutaneous Q24H GEORGINA TYRELL Peña      folic acid 1 mg, thiamine (VITAMIN B1) 100 mg in sodium chloride 0.9 % 100 mL IV piggyback   Intravenous Daily TYRELL Peña      magnesium sulfate  4 g Intravenous Once TYRELL Peña 4 g (05/31/24 0846)    nicotine  1 patch Transdermal Daily TYRELL Peña      ondansetron  4 mg Intravenous Q6H PRN TYRELL Peña      senna-docusate sodium  2 tablet Oral BID TYRELL Peña          Today, Patient Was Seen By: TYRELL Gomez    **Please Note: This note may have been constructed using a voice recognition system.**

## 2024-05-31 NOTE — UTILIZATION REVIEW
NOTIFICATION OF INPATIENT ADMISSION   AUTHORIZATION REQUEST   SERVICING FACILITY:   [place] [address]  Tax ID: [id number]  NPI: [id number] ATTENDING PROVIDER:  Attending Name and NPI#: Demarco Marshall Md [[id number]]  Address: [address] [place] [address]  Phone: [phone]     ADMISSION INFORMATION:  Place of Service: Inpatient [facility]  Place of Service Code: 21  Inpatient Admission Date/Time: 5/31/24 10:24 AM  Discharge Date/Time: 5/31/2024  3:20 PM  Admitting Diagnosis Code/Description:  Alcohol intoxication (HCC) [F10.929]  Hypoxia [R09.02]     UTILIZATION REVIEW CONTACT:  Gina Lomax Utilization   Network Utilization Review Department  Phone: [phone]  Fax [phone]  Email: [email or web address]  Contact for approvals/pending authorizations, clinical reviews, and discharge.     PHYSICIAN ADVISORY SERVICES:  Medical Necessity Denial & Sdfj-kb-[id number] Review  Phone: [phone]  Fax: [phone]  Email: [email or web address]     DISCHARGE SUPPORT TEAM:  For Patients Discharge Needs & Updates  Phone: [phone] opt. 2 Fax: [phone]  Email: [email or web address]

## 2024-05-31 NOTE — PROGRESS NOTES
During rounds at the start of shift around 1940, we noted pt has managed to pulled his hands off his  soft risk restraints, pt was was very anxious and agitated, treating to live the facility, his Ciwa score at the time was 18, when attempted to placed pt back in restraint, he became very agitated, attempted to throw punches at us, rist for hurting himself and staff members were very high, so notified security and floor on call, Kelsey Cuellar, come to bedside further assessed pt's behavior and lock restraint was ordered. Day shift nurse Audrey also mentioned pt had made suicidal comment: saying pt stated he wanted to kill  himself but when asked if he truly wants to killed himself he denied any suicidal thought/ideation. He stated he didn't said, 3hrs later I personally went in and did another suicidal assessment Pt still denied having any suicidal intent/ideation, refer to C-SSRS.

## 2024-05-31 NOTE — CONSULTS
"INTERPROFESSIONAL (PHONE) CONSULTATION - Medical Toxicology  Delonte Goldberg 43 y.o. male MRN: [id number]  Unit/Bed#: ICU 03 Encounter: [id number]      Reason for Consult / Principal Problem: Alcohol withdrawal    Inpatient consult to Toxicology  Consult performed by: Analia Marr MD  Consult ordered by: TYRELL Gomez        05/31/24      ASSESSMENT:  ETOH withdrawal  EtOH use disorder  Hyperglycemia  Hypomagnesemia      RECOMMENDATIONS:    If the patient is showing any symptoms of altered mental status or ataxia, I would start the patient on 500 mg 3 times daily IV thiamine x 3 days for treatment of symptoms of Wernicke's.    Medical Toxicology recommendations for CIWA monitoring using diazepam for treatment:    CIWA score & Treatment     Monitoring:   Modified CIWA Score   Telemetry  Continuous pulse oximetry   Initiate RASS with dosing and hold any sedatives for RASS less than -2  Request provider re-evaluation after every three doses.  Step down for CIWA > 7  Contact Medical Toxicology/Addiction \"Network Detox AP On Call\" via [employer] Text for worsening CIWA, persistent tachycardia or encephalopathy.       0  No intervention  Reassess q4 hours.   Consider discontinuing CIWA 24 hours after ethanol concentration of zero, with a score of zero    1-7  Diazepam 10 mg PO Q4hr PRN score 1-7  Reassess q4hr    8-14  Diazepam 10mg IV Q1hr PRN score 8-14  Reassess q1hr  Contact Medical Toxicology/Addiction \"Network Detox AP On Call\" via [employer] Text to discuss transfer to detox unit, step down or critical care per Detox AP.    15-19  Diazepam 20mg IV Q1hr PRN score 15-19  Reassess q1hr  Contact Medical Toxicology/Addiction \"Network Detox AP On Call\" via [employer] Text to discuss transfer to detox unit, step down or critical care per Detox AP and further treatment recommendations.    >20  Diazepam 40mg IV Q1hr PRN score > 20  Contact Medical Toxicology/Addiction \"Network Detox AP On Call\" via [employer] Text for " "additional treatment recommendations.       Once the patient's withdrawal is effectively managed, the patient can be placed on a diazepam taper, if needed.    Diazepam can be decreased by 1/2 of the last dose every hour until the patient is not needing more than 10 mg at a time; at which point diazepam 5mg PO  may be given Q6 hours PRN for 24 hours. Prior to discontinuation.     Please reach out to Medical Toxicology/Addiction \"Network Detox AP On Call\" via [employer] Text with any additional concerns.     If benzodiazepines are not adequately controlling the patient's symptoms, I would recommend considering phenobarbital.    Our favored dosing for phenobarbital in most ETOH withdrawal is as follows, however this should be adjusted based on age, liver dysfunction, level of GERALDINE agonist resistance:    Mild symptoms: 65 mg IV phenobarbital bolus  Moderate symptoms:  130 mg IV phenobarbital bolus  Moderate to severe symptoms:  260 mg IV phenobarbital bolus  Severe symptoms:  650 mg IV phenobarbital bolus  Extremely severe, requiring sedation or potentially intubation:  1 g IV phenobarbital bolus    Re-evaluate the patient every 30 minutes.  Additional phenobarbital can be administered based on new exam.    Hold for RASS -4 or -5  At times, patient may be resistant to phenobarbital and have a better response to IV diazepam.  Depending on severity, doses between 10 mg to 40 mg IV should be used if the patient appears to be refractory on phenobarbital.    Sedation using ketamine or Precedex can also be a good tool in terms of adjunct medications, however these are not GERALDINE agonist and do not treat underlying cause.  The patient did still be receiving GERALDINE agonist while sedation is being administered.    Generally speaking, after initial control of ETOH withdrawal symptoms have been aggressively controlled phenobarbital, additional re-dosing of phenobarbital is not needed as phenobarbital has a long half life and active " metabolites leading to a long duration of action.  Phenobarbital also does not need to be tapered for these reasons.    Once the patient has passed 8 hour since last phenobarbital dosing without any further administration of GERALDINE agonist, they can be cleared from an ETOH withdrawal standpoint.'    For further questions, please contact the medical  on call via [place] Text between 8am and 9pm. If between 9pm and 8am, please reach out to the Poison Center at [phone].     Please see additional teaching note below:    Ethanol Withdrawal  Department of Medical Toxicology  [facility]    Updated June 2019    Ethanol withdrawal can be precipitated by sudden discontinuation of chronic ethanol use. Symptoms of ethanol withdrawal syndrome include tremor, anxiety, headache, palpitations, insomnia, nausea and vomiting, diaphoresis, tachycardia and hypertension. In severe cases, seizures may also occur. Seizures are usually brief and generalized, and often occur within 6-12 hours after cessation of ethanol use. Each time someone goes through ethanol withdrawal, it is generally worse secondary to the Kindling Effect.     Sympathetic nervous system overactivity may progress to delirium tremens.  Delirium tremens is a life-threatening condition that is characterized by tachycardia, diaphoresis, hyperthermia, delirium and hallucinations.  It usually occurs about 48-72 hours after discontinuation of heavy ethanol use.  If not treated appropriately, significant morbidity and mortality can be associated.    The pathophysiology in ethanol dependence is associated with downregulation of GABAa receptors and upregulation of NMDA receptors. This is secondary to ethanol's continuous GERALDINE agonism and NDMA antagonism. When ethanol use is discontinued, this resulting state leads to the hyperexcitation associated with the withdrawal syndrome. Treatment is primarily targeted at decreasing the excitatory  state with sedatives, such as benzodiazepines and phenobarbital.  Adjunctive treatments may include dexmedetomidine or ketamine. Propofol may also be utilized in cases where the airway is protected.      Other complications to consider in the setting of ethanol dependence include hypoglycemia, alcoholic ketoacidosis, Wernicke's Encephalopahty and Wernicke-Korsakoff Syndrome. It is important to routinely provide nutrition, hydration and often thiamine.       Hx and PE limited by the dynamics of a phone consultation. I have not personally interviewed or evaluated the patient, but only advised based on the information provided to me. Primary provider is responsible for all clinical decisions.     Pertinent history, physical exam and clinical findings and course discussed: Delonte Goldberg is a 43 y.o. year old male who presented to the ED on 5/29 for alcohol intoxication and hypoxia, admitted in the morning of 5/30, now developing symptoms of EtOH withdrawal.  Last drink approximately 36 hours ago.  Patient has worsening CIWA scores, receiving multiple doses of 10 mg IV diazepam, 2 to 4 mg lorazepam and Librium.    Review of systems and physical exam not performed by me.    Historical Information   Past Medical History:   Diagnosis Date    Bipolar 2 disorder (HCC)      Past Surgical History:   Procedure Laterality Date    HAND SURGERY       Social History   Social History     Substance and Sexual Activity   Alcohol Use Yes    Comment: daily - couple L vodka daily per pt     Social History     Substance and Sexual Activity   Drug Use Yes    Types: Marijuana, Other    Comment: suboxone     Social History     Tobacco Use   Smoking Status Every Day    Types: Cigarettes   Smokeless Tobacco Never     History reviewed. No pertinent family history.     Prior to Admission medications    Medication Sig Start Date End Date Taking? Authorizing Provider   buprenorphine-naloxone (SUBOXONE) 8-2 mg per SL tablet Place 1 tablet  under the tongue daily Pt stated he takes 21 mg per day   Yes Historical Provider, MD   buPROPion (WELLBUTRIN) 75 mg tablet Take 75 mg by mouth 2 (two) times a day   Yes Historical Provider, MD       Current Facility-Administered Medications   Medication Dose Route Frequency    acetaminophen (TYLENOL) tablet 650 mg  650 mg Oral Q6H PRN    calcium carbonate (TUMS) chewable tablet 1,000 mg  1,000 mg Oral Daily PRN    chlordiazePOXIDE (LIBRIUM) capsule 50 mg  50 mg Oral Q6H GEORGINA    dextrose 5 % in lactated Ringer's infusion  100 mL/hr Intravenous Continuous    enoxaparin (LOVENOX) subcutaneous injection 40 mg  40 mg Subcutaneous Q24H GEORGINA    folic acid 1 mg, thiamine (VITAMIN B1) 100 mg in sodium chloride 0.9 % 100 mL IV piggyback   Intravenous Daily    magnesium sulfate 4 g/100 mL IVPB (premix) 4 g  4 g Intravenous Once    nicotine (NICODERM CQ) 14 mg/24hr TD 24 hr patch 1 patch  1 patch Transdermal Daily    ondansetron (ZOFRAN) injection 4 mg  4 mg Intravenous Q6H PRN    senna-docusate sodium (SENOKOT S) 8.6-50 mg per tablet 2 tablet  2 tablet Oral BID       No Known Allergies    Objective       Intake/Output Summary (Last 24 hours) at 5/31/2024 0946  Last data filed at 5/30/2024 1722  Gross per 24 hour   Intake 0 ml   Output 200 ml   Net -200 ml       Invasive Devices:   Peripheral IV 05/30/24 Distal;Dorsal (posterior);Right Forearm (Active)       Vitals   Vitals:    05/31/24 0700 05/31/24 0702 05/31/24 0736 05/31/24 0830   BP: 124/70 124/70 125/78    TempSrc: Oral      Pulse: 84  88    Resp: 18      Patient Position - Orthostatic VS: Lying      Temp: 97.8 °F (36.6 °C)   97.9 °F (36.6 °C)         EKG, Pathology, and/or Other Studies: I have personally reviewed pertinent reports.    Normal sinus rhythm 87, , QTc 469, no ST elevation or depression, no ectopy, no terminal R overall impression: No toxicologic findings    Lab Results: I have personally reviewed pertinent reports.      Labs:    Results from last 7  "days   Lab Units 05/31/24 0447 05/30/24  0303   WBC Thousand/uL 7.14 7.45   HEMOGLOBIN g/dL 13.0 13.5   HEMATOCRIT % 38.7 40.4   PLATELETS Thousands/uL 180 177   SEGS PCT %  --  59   LYMPHO PCT %  --  31   MONO PCT %  --  5   EOS PCT %  --  3      Results from last 7 days   Lab Units 05/31/24 0447 05/30/24  0303   SODIUM mmol/L 142 143   POTASSIUM mmol/L 3.6 3.1*   CHLORIDE mmol/L 108 108   CO2 mmol/L 25 26   BUN mg/dL 8 13   CREATININE mg/dL 0.58* 0.70   CALCIUM mg/dL 9.0 8.3*   ALK PHOS U/L  --  62   ALT U/L  --  11   AST U/L  --  18   MAGNESIUM mg/dL 1.5*  --               No results found for: \"TROPONINI\"      Results from last 7 days   Lab Units 05/30/24  0024   ETHANOL LVL mg/dL 148*     Invalid input(s): \"EXTPREGUR\"      Imaging Studies: I have personally reviewed pertinent reports.      Counseling / Coordination of Care  Total time spent today 35 minutes. This was a phone consultation.       "

## 2024-05-31 NOTE — QUICK NOTE
Upgrading patient to SD2 level of care due to progressively worsening CIWA scores. Patient has received multiple doses of Valium and Ativan overnight. Of note, control team called x 2 last night due to patient ripping out of his soft restraints and attempting to get physical with the staff- he is now in locked restraints with 1:1.     Overnight, patient had been responding to Ativan for a few hours but at 6AM CIWA back to 19. Last alcoholic beverage 05/29. Patient may need ICU level of care later today for Precedex gtt or phenobarb. Patient remains hemodynamically stable and oriented x 4. Critical care AP notified.

## 2024-05-31 NOTE — ASSESSMENT & PLAN NOTE
Patient presented to the ED with acute alcoholic intoxication while he was on his way to [facility] - while en route to the facility, he ingested several pints of alcohol.   ETOH level on admission 243  CIWA scores improved, noted to be 2 at 1400.  Toxicology consult, appreciate input

## 2024-05-31 NOTE — RESTRAINT FACE TO FACE
Restraint Face to Face   Delonte Goldberg 43 y.o. male MRN: [id number]  Unit/Bed#: -01 Encounter: [id number]      Physical Evaluation: oriented x 4, skin intact, NAD  Purpose for Restraints/ Seclusion High risk for causing significant disruption of treatment environment , High risk for harm to others, and high risk for flight  Patient's reaction to the intervention: agitated   Patient's medical condition: hemodynamically stable, increased CIWA score 18, alcohol withdrawal with increased agitation    Patient's Behavioral condition: agitated  Restraints to be Continued

## 2024-05-31 NOTE — PLAN OF CARE
Problem: SAFETY,RESTRAINT: NV/NON-SELF DESTRUCTIVE BEHAVIOR  Goal: Remains free of harm/injury (restraint for non violent/non self-detsructive behavior)  Description: INTERVENTIONS:  - Instruct patient/family regarding restraint use   - Assess and monitor physiologic and psychological status   - Provide interventions and comfort measures to meet assessed patient needs   - Identify and implement measures to help patient regain control  - Assess readiness for release of restraint   5/31/2024 0459 by Cheryl Nguyen RN  Outcome: Progressing  5/31/2024 0459 by Cheryl Nguyen RN  Outcome: Progressing  Goal: Returns to optimal restraint-free functioning  Description: INTERVENTIONS:  - Assess the patient's behavior and symptoms that indicate continued need for restraint  - Identify and implement measures to help patient regain control  - Assess readiness for release of restraint   5/31/2024 0459 by Cheryl Nguyen RN  Outcome: Progressing  5/31/2024 0459 by Cheryl Nguyen RN  Outcome: Progressing     Problem: SAFETY ADULT  Goal: Patient will remain free of falls  Description: INTERVENTIONS:  - Educate patient/family on patient safety including physical limitations  - Instruct patient to call for assistance with activity   - Consult OT/PT to assist with strengthening/mobility   - Keep Call bell within reach  - Keep bed low and locked with side rails adjusted as appropriate  - Keep care items and personal belongings within reach  - Initiate and maintain comfort rounds  - Make Fall Risk Sign visible to staff  -   Problem: Knowledge Deficit  Goal: Patient/family/caregiver demonstrates understanding of disease process, treatment plan, medications, and discharge instructions  Description: Complete learning assessment and assess knowledge base.  Interventions:  - Provide teaching at level of understanding  - Provide teaching via preferred learning methods  Outcome: Progressing     Problem: DISCHARGE PLANNING  Goal: Discharge to  home or other facility with appropriate resources  Description: INTERVENTIONS:  - Identify barriers to discharge w/patient and caregiver  - Arrange for needed discharge resources and transportation as appropriate  - Identify discharge learning needs (meds, wound care, etc.)  - Arrange for interpretive services to assist at discharge as needed  - Refer to Case Management Department for coordinating discharge planning if the patient needs post-hospital services based on physician/advanced practitioner order or complex needs related to functional status, cognitive ability, or social support system  Outcome: Progressing   - Apply yellow socks and bracelet for high fall risk patients  - Consider moving patient to room near nurses station  Outcome: Progressing

## 2024-06-05 NOTE — UTILIZATION REVIEW
NOTIFICATION OF ADMISSION DISCHARGE   This is a Notification of Discharge from [facility]. Please be advised that this patient has been discharge from our facility. Below you will find the admission and discharge date and time including the patient’s disposition.   UTILIZATION REVIEW CONTACT:  Trina Alicea  Utilization   Network Utilization Review Department  Phone: [phone] x carefully listen to the prompts. All voicemails are confidential.  Email: [email or web address]     ADMISSION INFORMATION  PRESENTATION DATE: 5/29/2024  8:39 PM  OBERVATION ADMISSION DATE:   INPATIENT ADMISSION DATE: 5/31/24 10:24 AM   DISCHARGE DATE: 5/31/2024  3:20 PM   DISPOSITION:Left against medical advice or discontinued care    Network Utilization Review Department  ATTENTION: Please call with any questions or concerns to [phone] and carefully listen to the prompts so that you are directed to the right person. All voicemails are confidential.   For Discharge needs, contact Care Management DC Support Team at [phone] opt. 2  Send all requests for admission clinical reviews, approved or denied determinations and any other requests to dedicated fax number below belonging to the campus where the patient is receiving treatment. List of dedicated fax numbers for the Facilities:  FACILITY NAME UR FAX NUMBER   ADMISSION DENIALS (Administrative/Medical Necessity) [phone]   DISCHARGE SUPPORT TEAM ([facility]) [phone]   PARENT CHILD HEALTH (Maternity/NICU/Pediatrics) [phone]   [facility] [phone]   [facility] [phone]   [facility] [phone]   [facility] [phone]   Critical access hospital [phone]   [facility] [phone]   [facility] [phone]   [facility]  [facility] [phone]   [facility] [phone]   Critical access hospital [phone]   [facility] [phone]   [facility] [phone]